# Patient Record
Sex: FEMALE | Race: OTHER | NOT HISPANIC OR LATINO | Employment: FULL TIME | URBAN - METROPOLITAN AREA
[De-identification: names, ages, dates, MRNs, and addresses within clinical notes are randomized per-mention and may not be internally consistent; named-entity substitution may affect disease eponyms.]

---

## 2017-02-06 ENCOUNTER — OFFICE VISIT (OUTPATIENT)
Dept: MEDICAL GROUP | Facility: MEDICAL CENTER | Age: 52
End: 2017-02-06
Payer: COMMERCIAL

## 2017-02-06 VITALS
HEART RATE: 64 BPM | BODY MASS INDEX: 20.72 KG/M2 | OXYGEN SATURATION: 95 % | HEIGHT: 67 IN | DIASTOLIC BLOOD PRESSURE: 68 MMHG | SYSTOLIC BLOOD PRESSURE: 120 MMHG | WEIGHT: 132 LBS | TEMPERATURE: 98.1 F

## 2017-02-06 DIAGNOSIS — Z00.00 PREVENTATIVE HEALTH CARE: Chronic | ICD-10-CM

## 2017-02-06 DIAGNOSIS — M25.511 RIGHT SHOULDER PAIN, UNSPECIFIED CHRONICITY: ICD-10-CM

## 2017-02-06 DIAGNOSIS — M54.5 LOW BACK PAIN, UNSPECIFIED BACK PAIN LATERALITY, UNSPECIFIED CHRONICITY, WITH SCIATICA PRESENCE UNSPECIFIED: ICD-10-CM

## 2017-02-06 DIAGNOSIS — E78.5 DYSLIPIDEMIA: Chronic | ICD-10-CM

## 2017-02-06 DIAGNOSIS — R10.31 RIGHT LOWER QUADRANT PAIN: ICD-10-CM

## 2017-02-06 PROCEDURE — 99396 PREV VISIT EST AGE 40-64: CPT | Performed by: INTERNAL MEDICINE

## 2017-02-06 ASSESSMENT — ENCOUNTER SYMPTOMS
DEPRESSION: 0
PALPITATIONS: 0
SHORTNESS OF BREATH: 0
WEIGHT LOSS: 0
HEARTBURN: 0
COUGH: 0
BLURRED VISION: 0
BACK PAIN: 1
DIARRHEA: 0
INSOMNIA: 0
DOUBLE VISION: 0
HEADACHES: 1
DIZZINESS: 0
ABDOMINAL PAIN: 0

## 2017-02-06 ASSESSMENT — PATIENT HEALTH QUESTIONNAIRE - PHQ9: CLINICAL INTERPRETATION OF PHQ2 SCORE: 0
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
## 2017-02-06 NOTE — MR AVS SNAPSHOT
"        Frank Fabian   2017 2:00 PM   Office Visit   MRN: [id number]    Department:  [facility]   Dept Phone:  [phone]    Description:  Female : 1965   Provider:  Chago Fontenot M.D.           Allergies as of 2017     No Known Allergies      You were diagnosed with     Preventative health care   [[id number]]       Right lower quadrant pain   [436186]       Dyslipidemia   [150256]       Low back pain, unspecified back pain laterality, unspecified chronicity, with sciatica presence unspecified   [[id number]]       Right shoulder pain, unspecified chronicity   [[id number]]         Vital Signs     Blood Pressure Pulse Temperature Height Weight Body Mass Index    120/68 mmHg 64 36.7 °C (98.1 °F) 1.702 m (5' 7\") 59.875 kg (132 lb) 20.67 kg/m2    Oxygen Saturation Smoking Status                95% Never Smoker           Basic Information     Date Of Birth Sex Race Ethnicity Preferred Language    1965 Female Other Non- English      Problem List              ICD-10-CM Priority Class Noted - Resolved    S/P appendectomy Z90.49   2010 - Present    Preventative health care (Chronic) Z00.00   2010 - Present    Low back pain M54.5   2010 - Present    Dyslipidemia (Chronic) E78.5   2010 - Present    History of depression Z86.59   2010 - Present    Shoulder pain M25.519   2013 - Present    History of UTI Z87.440   2015 - Present      Health Maintenance        Date Due Completion Dates    IMM DTaP/Tdap/Td Vaccine (1 - Tdap) 1986 ---    IMM INFLUENZA (1) 2016 ---    PAP SMEAR 2016, 2012    MAMMOGRAM 2016 (Declined), 2015 (Declined), 2014 (Declined), 2013 (Declined)    Override on 2015: Patient Declined    Override on 2015: Patient Declined    Override on 2014: Patient Declined    Override on 2013: Patient Declined            Current Immunizations     No immunizations on file.      "   Below and/or attached are the medications your provider expects you to take. Review all of your home medications and newly ordered medications with your provider and/or pharmacist. Follow medication instructions as directed by your provider and/or pharmacist. Please keep your medication list with you and share with your provider. Update the information when medications are discontinued, doses are changed, or new medications (including over-the-counter products) are added; and carry medication information at all times in the event of emergency situations     Allergies:  No Known Allergies          Medications  Valid as of: February 06, 2017 -  2:30 PM    Generic Name Brand Name Tablet Size Instructions for use    .                 Medicines prescribed today were sent to:     [id number] - [place], [place] [address]    [address] [place] [address]    Phone: [phone] Fax: [phone]    Open 24 Hours?: No      Medication refill instructions:       If your prescription bottle indicates you have medication refills left, it is not necessary to call your provider’s office. Please contact your pharmacy and they will refill your medication.    If your prescription bottle indicates you do not have any refills left, you may request refills at any time through one of the following ways: The online [employer] system (except Urgent Care), by calling your provider’s office, or by asking your pharmacy to contact your provider’s office with a refill request. Medication refills are processed only during regular business hours and may not be available until the next business day. Your provider may request additional information or to have a follow-up visit with you prior to refilling your medication.   *Please Note: Medication refills are assigned a new Rx number when refilled electronically. Your pharmacy may indicate that no refills were authorized even though a new prescription for the same medication is  available at the pharmacy. Please request the medicine by name with the pharmacy before contacting your provider for a refill.        Your To Do List     Future Labs/Procedures Complete By Expires    CBC WITH DIFFERENTIAL  As directed 2/7/2018    COMP METABOLIC PANEL  As directed 2/7/2018    ESTRADIOL  As directed 2/6/2018    FSH/LH  As directed 2/7/2018    LIPID PROFILE  As directed 2/7/2018    TSH  As directed 2/7/2018    URINALYSIS,CULTURE IF INDICATED  As directed 2/7/2018    US-GYN-PELVIS TRANSVAGINAL  As directed 2/6/2018    VITAMIN D,25 HYDROXY  As directed 2/7/2018      Other Notes About Your Plan                      MyChart Access Code: Activation code not generated  Current [employer] Status: Active

## 2017-02-12 LAB
25(OH)D3+25(OH)D2 SERPL-MCNC: 22.7 NG/ML (ref 30–100)
ALBUMIN SERPL-MCNC: 4.2 G/DL (ref 3.5–5.5)
ALBUMIN/GLOB SERPL: 1.5 {RATIO} (ref 1.1–2.5)
ALP SERPL-CCNC: 59 IU/L (ref 39–117)
ALT SERPL-CCNC: 13 IU/L (ref 0–32)
APPEARANCE UR: CLEAR
AST SERPL-CCNC: 16 IU/L (ref 0–40)
BACTERIA #/AREA URNS HPF: NORMAL /[HPF]
BACTERIA UR CULT: NORMAL
BACTERIA UR CULT: NORMAL
BASOPHILS # BLD AUTO: 0 X10E3/UL (ref 0–0.2)
BASOPHILS NFR BLD AUTO: 0 %
BILIRUB SERPL-MCNC: 1.1 MG/DL (ref 0–1.2)
BILIRUB UR QL STRIP: NEGATIVE
BUN SERPL-MCNC: 12 MG/DL (ref 6–24)
BUN/CREAT SERPL: 15 (ref 9–23)
CALCIUM SERPL-MCNC: 9.1 MG/DL (ref 8.7–10.2)
CASTS URNS MICRO: NORMAL
CASTS URNS QL MICRO: NORMAL
CHLORIDE SERPL-SCNC: 102 MMOL/L (ref 96–106)
CHOLEST SERPL-MCNC: 239 MG/DL (ref 100–199)
CO2 SERPL-SCNC: 25 MMOL/L (ref 18–29)
COLOR UR: YELLOW
COMMENT 011824: ABNORMAL
CREAT SERPL-MCNC: 0.82 MG/DL (ref 0.57–1)
CRYSTALS URNS MICRO: NORMAL
EOSINOPHIL # BLD AUTO: 0.1 X10E3/UL (ref 0–0.4)
EOSINOPHIL NFR BLD AUTO: 1 %
EPI CELLS #/AREA URNS HPF: NORMAL /HPF
ERYTHROCYTE [DISTWIDTH] IN BLOOD BY AUTOMATED COUNT: 14.1 % (ref 12.3–15.4)
ESTRADIOL SERPL-MCNC: 223 PG/ML
FSH SERPL-ACNC: 4.9 MIU/ML
GLOBULIN SER CALC-MCNC: 2.8 G/DL (ref 1.5–4.5)
GLUCOSE SERPL-MCNC: 91 MG/DL (ref 65–99)
GLUCOSE UR QL: NEGATIVE
HCT VFR BLD AUTO: 43.6 % (ref 34–46.6)
HDLC SERPL-MCNC: 96 MG/DL
HGB BLD-MCNC: 14.2 G/DL (ref 11.1–15.9)
HGB UR QL STRIP: NEGATIVE
IMM GRANULOCYTES # BLD: 0 X10E3/UL (ref 0–0.1)
IMM GRANULOCYTES NFR BLD: 0 %
IMMATURE CELLS  115398: NORMAL
KETONES UR QL STRIP: NEGATIVE
LDLC SERPL CALC-MCNC: 132 MG/DL (ref 0–99)
LEUKOCYTE ESTERASE UR QL STRIP: ABNORMAL
LH SERPL-ACNC: 6.8 MIU/ML
LYMPHOCYTES # BLD AUTO: 1.6 X10E3/UL (ref 0.7–3.1)
LYMPHOCYTES NFR BLD AUTO: 24 %
MCH RBC QN AUTO: 29.5 PG (ref 26.6–33)
MCHC RBC AUTO-ENTMCNC: 32.6 G/DL (ref 31.5–35.7)
MCV RBC AUTO: 91 FL (ref 79–97)
MICRO URNS: ABNORMAL
MICRO URNS: ABNORMAL
MONOCYTES # BLD AUTO: 0.6 X10E3/UL (ref 0.1–0.9)
MONOCYTES NFR BLD AUTO: 9 %
MORPHOLOGY BLD-IMP: NORMAL
MUCOUS THREADS URNS QL MICRO: PRESENT
NEUTROPHILS # BLD AUTO: 4.4 X10E3/UL (ref 1.4–7)
NEUTROPHILS NFR BLD AUTO: 66 %
NITRITE UR QL STRIP: NEGATIVE
NRBC BLD AUTO-RTO: NORMAL %
PH UR STRIP: 7 [PH] (ref 5–7.5)
PLATELET # BLD AUTO: 305 X10E3/UL (ref 150–379)
POTASSIUM SERPL-SCNC: 4.7 MMOL/L (ref 3.5–5.2)
PROT SERPL-MCNC: 7 G/DL (ref 6–8.5)
PROT UR QL STRIP: NEGATIVE
RBC # BLD AUTO: 4.81 X10E6/UL (ref 3.77–5.28)
RBC #/AREA URNS HPF: NORMAL /HPF
RENAL EPI CELLS #/AREA URNS HPF: NORMAL /[HPF]
SODIUM SERPL-SCNC: 140 MMOL/L (ref 134–144)
SP GR UR: 1.02 (ref 1–1.03)
T VAGINALIS URNS QL MICRO: NORMAL
TRIGL SERPL-MCNC: 53 MG/DL (ref 0–149)
TSH SERPL DL<=0.005 MIU/L-ACNC: 2.49 UIU/ML (ref 0.45–4.5)
UNIDENT CRYS URNS QL MICRO: NORMAL
URINALYSIS REFLEX  377202: ABNORMAL
URNS CMNT MICRO: NORMAL
UROBILINOGEN UR STRIP-MCNC: 0.2 MG/DL (ref 0.2–1)
VLDLC SERPL CALC-MCNC: 11 MG/DL (ref 5–40)
WBC # BLD AUTO: 6.7 X10E3/UL (ref 3.4–10.8)
WBC #/AREA URNS HPF: NORMAL /HPF
YEAST #/AREA URNS HPF: NORMAL /[HPF]

## 2017-02-13 ENCOUNTER — TELEPHONE (OUTPATIENT)
Dept: MEDICAL GROUP | Facility: MEDICAL CENTER | Age: 52
End: 2017-02-13

## 2017-02-14 NOTE — TELEPHONE ENCOUNTER
Notified with results, start vitamin D 4000 units daily  Continue good work with nutrition and exercise

## 2017-02-28 ENCOUNTER — TELEPHONE (OUTPATIENT)
Dept: MEDICAL GROUP | Facility: MEDICAL CENTER | Age: 52
End: 2017-02-28

## 2017-02-28 NOTE — TELEPHONE ENCOUNTER
Pt left message stating she was not contacted by imaging to schedule. Spoke with pt and connected her to [facility] with imaging to schedule.

## 2017-03-21 ENCOUNTER — HOSPITAL ENCOUNTER (OUTPATIENT)
Dept: RADIOLOGY | Facility: MEDICAL CENTER | Age: 52
End: 2017-03-21
Attending: INTERNAL MEDICINE
Payer: COMMERCIAL

## 2017-08-25 ENCOUNTER — OFFICE VISIT (OUTPATIENT)
Dept: MEDICAL GROUP | Facility: MEDICAL CENTER | Age: 52
End: 2017-08-25
Payer: COMMERCIAL

## 2017-08-25 ENCOUNTER — HOSPITAL ENCOUNTER (OUTPATIENT)
Facility: MEDICAL CENTER | Age: 52
End: 2017-08-25
Attending: INTERNAL MEDICINE
Payer: COMMERCIAL

## 2017-08-25 VITALS
TEMPERATURE: 99.8 F | DIASTOLIC BLOOD PRESSURE: 56 MMHG | HEART RATE: 91 BPM | BODY MASS INDEX: 19.15 KG/M2 | HEIGHT: 67 IN | SYSTOLIC BLOOD PRESSURE: 102 MMHG | OXYGEN SATURATION: 97 % | WEIGHT: 122 LBS

## 2017-08-25 DIAGNOSIS — N39.0 URINARY TRACT INFECTION WITHOUT HEMATURIA, SITE UNSPECIFIED: ICD-10-CM

## 2017-08-25 LAB
APPEARANCE UR: ABNORMAL
BILIRUB UR STRIP-MCNC: ABNORMAL MG/DL
COLOR UR AUTO: ABNORMAL
GLUCOSE UR STRIP.AUTO-MCNC: ABNORMAL MG/DL
KETONES UR STRIP.AUTO-MCNC: ABNORMAL MG/DL
LEUKOCYTE ESTERASE UR QL STRIP.AUTO: ABNORMAL
NITRITE UR QL STRIP.AUTO: ABNORMAL
PH UR STRIP.AUTO: 5 [PH] (ref 5–8)
PROT UR QL STRIP: ABNORMAL MG/DL
RBC UR QL AUTO: ABNORMAL
SP GR UR STRIP.AUTO: 1.01
UROBILINOGEN UR STRIP-MCNC: ABNORMAL MG/DL

## 2017-08-25 PROCEDURE — 99214 OFFICE O/P EST MOD 30 MIN: CPT | Performed by: INTERNAL MEDICINE

## 2017-08-25 PROCEDURE — 81002 URINALYSIS NONAUTO W/O SCOPE: CPT | Performed by: INTERNAL MEDICINE

## 2017-08-25 PROCEDURE — 87086 URINE CULTURE/COLONY COUNT: CPT

## 2017-08-25 PROCEDURE — 87077 CULTURE AEROBIC IDENTIFY: CPT

## 2017-08-25 PROCEDURE — 87186 SC STD MICRODIL/AGAR DIL: CPT

## 2017-08-25 RX ORDER — SULFAMETHOXAZOLE AND TRIMETHOPRIM 800; 160 MG/1; MG/1
1 TABLET ORAL 2 TIMES DAILY
Qty: 14 TAB | Refills: 0 | Status: SHIPPED | OUTPATIENT
Start: 2017-08-25 | End: 2017-12-15

## 2017-08-25 NOTE — MR AVS SNAPSHOT
"Frank Fabian   2017 9:20 AM   Office Visit   MRN: [id number]    Department:  [facility]   Dept Phone:  [phone]    Description:  Female : 1965   Provider:  Chago Fontenot M.D.           Reason for Visit     Dysuria poss UTI      Allergies as of 2017     No Known Allergies      You were diagnosed with     Urinary tract infection without hematuria, site unspecified   [[id number]]         Vital Signs     Blood Pressure Pulse Temperature Height Weight Body Mass Index    102/56 mmHg 91 37.7 °C (99.8 °F) 1.702 m (5' 7\") 55.339 kg (122 lb) 19.10 kg/m2    Oxygen Saturation Smoking Status                97% Never Smoker           Basic Information     Date Of Birth Sex Race Ethnicity Preferred Language    1965 Female Other Non- English      Problem List              ICD-10-CM Priority Class Noted - Resolved    S/P appendectomy Z90.49   2010 - Present    Preventative health care (Chronic) Z00.00   2010 - Present    Low back pain M54.5   2010 - Present    Dyslipidemia (Chronic) E78.5   2010 - Present    History of depression Z86.59   2010 - Present    Shoulder pain M25.519   2013 - Present    History of UTI Z87.440   2015 - Present      Health Maintenance        Date Due Completion Dates    IMM DTaP/Tdap/Td Vaccine (1 - Tdap) 1986 ---    PAP SMEAR 2016, 2012    MAMMOGRAM 2016 (Declined), 2015 (Declined), 2014 (Declined), 2013 (Declined)    Override on 2015: Patient Declined    Override on 2015: Patient Declined    Override on 2014: Patient Declined    Override on 2013: Patient Declined    COLONOSCOPY 2017 ---    IMM INFLUENZA (1) 2017 ---            Results     POCT Urinalysis      Component Value Standard Range & Units    POC Color Michelle Negative    POC Appearance CLOUDY Negative    POC Leukocyte Esterase MOD Negative    POC Nitrites NEG Negative   " POC Urobiligen NEG Negative (0.2) mg/dL    POC Protein TRACE Negative mg/dL    POC Urine PH 5.0 5.0 - 8.0    POC Blood MOD Negative    POC Specific Gravity 1.015 <1.005 - >1.030    POC Ketones NEG Negative mg/dL    POC Biliruben NEG Negative mg/dL    POC Glucose NEG Negative mg/dL                        Current Immunizations     No immunizations on file.      Below and/or attached are the medications your provider expects you to take. Review all of your home medications and newly ordered medications with your provider and/or pharmacist. Follow medication instructions as directed by your provider and/or pharmacist. Please keep your medication list with you and share with your provider. Update the information when medications are discontinued, doses are changed, or new medications (including over-the-counter products) are added; and carry medication information at all times in the event of emergency situations     Allergies:  No Known Allergies          Medications  Valid as of: August 25, 2017 - 10:15 AM    Generic Name Brand Name Tablet Size Instructions for use    Sulfamethoxazole-Trimethoprim (Tab) BACTRIM -160 MG Take 1 Tab by mouth 2 times a day.        .                 Medicines prescribed today were sent to:     [id number] - [place], [place] - [address]    [address] [place] [address]    Phone: [phone] Fax: [phone]    Open 24 Hours?: No      Medication refill instructions:       If your prescription bottle indicates you have medication refills left, it is not necessary to call your provider’s office. Please contact your pharmacy and they will refill your medication.    If your prescription bottle indicates you do not have any refills left, you may request refills at any time through one of the following ways: The online [employer] system (except Urgent Care), by calling your provider’s office, or by asking your pharmacy to contact your provider’s office with a refill request.  Medication refills are processed only during regular business hours and may not be available until the next business day. Your provider may request additional information or to have a follow-up visit with you prior to refilling your medication.   *Please Note: Medication refills are assigned a new Rx number when refilled electronically. Your pharmacy may indicate that no refills were authorized even though a new prescription for the same medication is available at the pharmacy. Please request the medicine by name with the pharmacy before contacting your provider for a refill.        Your To Do List     Future Labs/Procedures Complete By Expires    URINE CULTURE(NEW)  As directed 8/25/2018      Other Notes About Your Plan     2/6/17 fatigue, hot flashes, question perimenopausal or premenstrual dysphoric disorder, gynecologic ultrasound ordered, schedule follow-up pap smear  2/13/17 cbc,cmp,tsh,FSH,LH,estradiol normal  8/25/17 UTI?   Mammogram,colon                   MyChart Access Code: Activation code not generated  Current MyChart Status: Active
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
## 2017-08-27 ENCOUNTER — TELEPHONE (OUTPATIENT)
Dept: MEDICAL GROUP | Facility: MEDICAL CENTER | Age: 52
End: 2017-08-27

## 2017-08-27 LAB
BACTERIA UR CULT: ABNORMAL
SIGNIFICANT IND 70042: ABNORMAL
SOURCE SOURCE: ABNORMAL

## 2017-08-27 NOTE — TELEPHONE ENCOUNTER
Notified with results  UTI organism sensitive to Bactrim which she was started on Friday she will complete course of antibiotic

## 2017-08-28 ENCOUNTER — TELEPHONE (OUTPATIENT)
Dept: MEDICAL GROUP | Facility: MEDICAL CENTER | Age: 52
End: 2017-08-28

## 2017-08-28 DIAGNOSIS — N39.0 URINARY TRACT INFECTION WITHOUT HEMATURIA, SITE UNSPECIFIED: ICD-10-CM

## 2017-08-28 RX ORDER — CIPROFLOXACIN 250 MG/1
250 TABLET, FILM COATED ORAL 2 TIMES DAILY
Qty: 14 TAB | Refills: 0 | Status: SHIPPED | OUTPATIENT
Start: 2017-08-28 | End: 2017-12-15

## 2017-08-28 NOTE — TELEPHONE ENCOUNTER
If she is having headache from the antibiotic, we can change to a different antibiotic, even though she had the same antibiotic 4 years ago without side effects.    I will change her to Cipro, one pill twice a day for 7 days, she has also had this antibiotic previously without issues. Sent to [facility].    I will send a prescription to her pharmacy

## 2017-08-28 NOTE — TELEPHONE ENCOUNTER
1. Caller Name: Frank Fabian                                           Call Back Number: [phone] (home) [phone] (work)       2. Patient‘s symptoms (location & severity)? HA severe since starting ABX in bed x3 days    3. Is this a new symptom? yes    4. When did it start? FRI    5. Action taken per active symptom guide: no (Same day scheduled; UC recommended; ED recommended)    6. Patient agrees to recommended action per active symptom guide: no  If no, patient was advised again of recommendation and patient verbalized understanding.

## 2017-08-29 NOTE — TELEPHONE ENCOUNTER
Please notify patient that we can order labs for her kidney function, I will place those orders in the computer system.    I do not have any record of her being on contraceptive therapy currently, does she get this from her gynecologist?

## 2017-08-29 NOTE — TELEPHONE ENCOUNTER
Pt went to  and they told her to drink water because she was dehydrated. She will finish the original ABX if you want her to. No headache. Please advise.     Pt has R kidney pain. Wants to make sure her kidneys are ok.    Pt thinks her UTI's are caused by her contraception. Pt would like to switch contraception. Told her she would need an appt for this. Pt scheduled on 9/1/17.

## 2017-09-01 ENCOUNTER — OFFICE VISIT (OUTPATIENT)
Dept: MEDICAL GROUP | Facility: MEDICAL CENTER | Age: 52
End: 2017-09-01
Payer: COMMERCIAL

## 2017-09-01 VITALS
DIASTOLIC BLOOD PRESSURE: 58 MMHG | OXYGEN SATURATION: 96 % | HEART RATE: 85 BPM | TEMPERATURE: 97.7 F | HEIGHT: 67 IN | WEIGHT: 122 LBS | BODY MASS INDEX: 19.15 KG/M2 | SYSTOLIC BLOOD PRESSURE: 104 MMHG

## 2017-09-01 DIAGNOSIS — Z87.440 HISTORY OF UTI: ICD-10-CM

## 2017-09-01 PROCEDURE — 99213 OFFICE O/P EST LOW 20 MIN: CPT | Performed by: INTERNAL MEDICINE

## 2017-09-01 NOTE — PROGRESS NOTES
"Subjective:      Frank Fabian is a 52 y.o. female who presents with UTI        HPI     Follow-up recent UTI Proteus sensitive to all antibiotics, started and completed Bactrim, did have initial headache urgent care thought to be dehydrated, completed course of Bactrim without rash, no current dysuria, no hematuria, did have some flank pain initially but complete a seven-day course of Bactrim, currently afebrile.  Has had urinary tract infections on occasion, no more than one per year, perimenopausal, last menstrual cycle in May, no birth control.  No diabetes, no history of immunosuppression.  No vaginal bleeding or spotting.  Urinary tract infections do not occur after intercourse.        Current Outpatient Prescriptions   Medication Sig Dispense Refill   • sulfamethoxazole-trimethoprim (BACTRIM DS) 800-160 MG tablet Take 1 Tab by mouth 2 times a day. 14 Tab 0   • ciprofloxacin (CIPRO) 250 MG Tab Take 1 Tab by mouth 2 times a day. 14 Tab 0     No current facility-administered medications for this visit.      Patient Active Problem List    Diagnosis Date Noted   • History of UTI 09/29/2015   • Shoulder pain 05/16/2013   • History of depression 12/02/2010   • S/P appendectomy 11/09/2010   • Preventative health care 11/09/2010   • Low back pain 11/09/2010   • Dyslipidemia 11/09/2010         ROS       Objective:     /58   Pulse 85   Temp 36.5 °C (97.7 °F)   Ht 1.702 m (5' 7\")   Wt 55.3 kg (122 lb)   SpO2 96%   BMI 19.11 kg/m²      Physical Exam   Constitutional: She appears well-developed and well-nourished. No distress.   HENT:   Head: Normocephalic and atraumatic.   Eyes: Conjunctivae are normal. Right eye exhibits no discharge. Left eye exhibits no discharge.   Neck: No JVD present.   Cardiovascular: Normal rate.    Pulmonary/Chest: Effort normal. No respiratory distress. She has no wheezes.   Abdominal: She exhibits no distension.   Musculoskeletal: She exhibits no edema.   Neurological: She is " alert.   Skin: She is not diaphoretic.   Psychiatric: She has a normal mood and affect. Her behavior is normal.   Nursing note and vitals reviewed.              Assessment/Plan:        Assessment  #1 recent UTI resolved, Proteus organism sensitive to Bactrim, no further symptoms    #2 history of recurrent UTIs typically one per year, not occurring after sexual activity    #3 perimenopausal occasional hot flashes, last menstrual cycle in May      Plan  #1 no further treatment for this UTI required we did send a earlier prescription to the pharmacy for ciprofloxacin thinking that she may have had a reaction to Bactrim, but she went to urgent care and was told that the headache was related to dehydration, she completed Bactrim without issue    #2 for recurrent UTI or UTI after intercourse she could consider antibiotics, if she had more than more than 3 urinary tract infections per year we could consider prophylaxis but her frequency is much less, should she have UTI after intercourse, she could try antibiotics after intercourse but this is not the case, I would recommend having Cipro on hand in case she should develop urinary tract symptoms, typically once a year this would not be a problem, the ciprofloxacin prescription is already at her pharmacy    #3 follow-up for Pap smear    #4 she can discontinue spermicide and have her partner use condoms with activity

## 2017-12-05 ENCOUNTER — TELEPHONE (OUTPATIENT)
Dept: MEDICAL GROUP | Facility: MEDICAL CENTER | Age: 52
End: 2017-12-05

## 2017-12-05 NOTE — TELEPHONE ENCOUNTER
1. Caller Name: Pt                      Call Back Number: [phone]    2. Message: Pt called stating she is having pain in her lower right abdomen. She is wondering if she should have the other US done that you guys discussed at her physical in February.    3. Patient approves office to leave a detailed voicemail/MyChart message: N\A

## 2017-12-06 NOTE — TELEPHONE ENCOUNTER
If she is having lower abdominal pain she should be seen and evaluated, an ultrasound would not be the ideal study for that, possibly she may need a CT scan.  Nevertheless, she should be seen for that symptom

## 2017-12-15 ENCOUNTER — OFFICE VISIT (OUTPATIENT)
Dept: MEDICAL GROUP | Facility: MEDICAL CENTER | Age: 52
End: 2017-12-15
Payer: COMMERCIAL

## 2017-12-15 ENCOUNTER — TELEPHONE (OUTPATIENT)
Dept: MEDICAL GROUP | Facility: MEDICAL CENTER | Age: 52
End: 2017-12-15

## 2017-12-15 VITALS
HEART RATE: 85 BPM | DIASTOLIC BLOOD PRESSURE: 72 MMHG | BODY MASS INDEX: 19.46 KG/M2 | HEIGHT: 67 IN | TEMPERATURE: 97.9 F | SYSTOLIC BLOOD PRESSURE: 108 MMHG | WEIGHT: 124 LBS | OXYGEN SATURATION: 98 %

## 2017-12-15 DIAGNOSIS — Z12.39 BREAST CANCER SCREENING: ICD-10-CM

## 2017-12-15 DIAGNOSIS — Z12.11 COLON CANCER SCREENING: ICD-10-CM

## 2017-12-15 DIAGNOSIS — R10.31 RIGHT LOWER QUADRANT ABDOMINAL PAIN: ICD-10-CM

## 2017-12-15 DIAGNOSIS — R10.11 RIGHT UPPER QUADRANT PAIN: ICD-10-CM

## 2017-12-15 PROCEDURE — 99214 OFFICE O/P EST MOD 30 MIN: CPT | Performed by: INTERNAL MEDICINE

## 2017-12-16 NOTE — PROGRESS NOTES
Subjective:      Frank Fabian is a 52 y.o. female who presents with right lower quadrant pain        HPI    Has had right lower quadrant pain since February, has seen gyn and had pelvic ultrasound done at [facility] in May apparently negative we do not have those results, discomfort does not happen daily, will vary in intensity, may be few minutes to hours all day, pain scale range 3-4/10, can be sharp or dull in nature, no associated nausea or emesis, no diarrhea, no melena, no constipation, pain occurs during night and day, no bloating or gas, no change in stools, no blood in stool, no obvious radiation to the back, no fevers or chills, not related to meals or food, not related to exercise or activity, no trauma, menses infrequent and not related to pain, no dysuria or hematuria, occasional right upper quadrant discomfort not associated with lower abdominal pain, the right upper quadrant discomfort not daily, not as frequent and no nausea or emesis, no gerd, no bloating associated with this, no change in appetite or weight , no regular NSAIDs, no recent antibiotics, no cough, short of breath, sore throat, chest pain, right upper quadrant pain not as severe, no radiation  History of appendectomy  No history of gallstones or kidney stones  Chronic low back pain  Chronic right shoulder pain  Not related to coffee   No vaginal bleedin   No nsaids, no tylenol   I saw her in February questioned IBS versus adhesions from appendectomy, ordered gynecologic ultrasound which apparently was done at [place] although I did not get that result        Current Outpatient Prescriptions   Medication Sig Dispense Refill   • ciprofloxacin (CIPRO) 250 MG Tab Take 1 Tab by mouth 2 times a day. 14 Tab 0   • sulfamethoxazole-trimethoprim (BACTRIM DS) 800-160 MG tablet Take 1 Tab by mouth 2 times a day. 14 Tab 0     No current facility-administered medications for this visit.      Patient Active Problem List   Diagnosis   • S/P  appendectomy   • Preventative health care   • Low back pain   • Dyslipidemia   • History of depression   • Shoulder pain   • History of UTI         Dyslipidemia  10/10 chol 239,trig 71,hdl 81,ldl 144  7/12 chol 226,trig 53,hdl 73,ldl 142  10/16/12 chol 249,trig 74,hdl 91,ldl 143  9/17/13 chol 225,trig 66,hdl 76,ldl 136  10/11/13 chol 245,trig 74,hdl 82,ldl 148  11/22/14 chol 216,trig 60,hdl 81,ldl 123    11/25/15 chol 204,trig 54,hdl 76,ldl 117  7/30/16 chol 254,trig 66,hdl 92,ldl 149  2/13/17 chl 239,trig 53,hdl 96,ldl 132     History depression  Had been on prozac many yrs ago  12/2/10 start zoloft 50 mg  1/11 off zoloft  3/14/13 start zoloft    11/18/14 off zoloft     History UTI  3/14/13 UTI e.coli sensitive to all antibiotics  6/24/14 UTI e.coli    9/29/15 UTI symptoms self treated with cipro    8/25/17 UTI proteus sensitive to all antbiotics except nitrofurantoin, started on bactrim  9/1/17 use cipro prn UTI symptoms     Low back pain  11/09 MRI LS L4-5 disc reveals minimal disc bulging   12/21/16 MRI lumbar spine slight interval progression of lumbar spondylosis compared to 2009 per  [facility]   2/6/17 has tried anti-inflammatories, prednisone, narcotics, physical therapy, acupuncture no benefit     Preventative health  9/12/13 pap negative  11/19/15 declines mammogram,flu,tdap  6/17/16 td in [place]  2/6/17 declines mammogram  2/13/17 vit d 22 start 4000 units daily     Status post appendectomy     Shoulder pain  5/16/13 x-ray of shoulder minimal DTT acromioclavicular joint  5/16/13 MRI left shoulder moderate acromioclavicular edema suggesting grade 1 sprain or overuse, intact rotator cuff and labrum  9/23/13 saw [facility] no report yet, surgery recommended, would like second opinion, referral to  pending  2/26/14  ortho note; left a.c. joint injection for bursitis, repeat a.c. joint or possible subacromial or subcoracoid injection in the future if symptoms persist  9/22/16  " [facility] note anterior right shoulder pain unclear etiology, referred for MRI  10/5/16 MRI right shoulder tendinosis supraspinatus, mild DJD right before meals with significant marrow edema and soft tissue edema, likely external impingement supraspinatus  12/20/16  orthopedic note persistent right shoulder pain despite steroid injection, right shoulder rotator cuff syndrome and before meals injury, possible subcoracoid impingement, offered repeat injection versus arthroscopy, patient will consider       Health Maintenance Summary                IMM DTaP/Tdap/Td Vaccine Overdue 6/16/1986     PAP SMEAR Overdue 9/12/2016      Done 9/12/2013 PAP IG, CT-NG,RFXHPV ALL 16&18     Patient has more history with this topic...    MAMMOGRAM Overdue 11/19/2016      Patient Declined 11/19/2015      Patient has more history with this topic...    COLONOSCOPY Overdue 6/16/2017     IMM INFLUENZA Overdue 9/1/2017           Patient Care Team:  Chago Fontenot M.D. as PCP - General (Internal Medicine)    ROS       Objective:     /72   Pulse 85   Temp 36.6 °C (97.9 °F)   Ht 1.702 m (5' 7\")   Wt 56.2 kg (124 lb)   SpO2 98%   BMI 19.42 kg/m²      Physical Exam   Constitutional: She appears well-developed and well-nourished. No distress.   HENT:   Head: Normocephalic and atraumatic.   Right Ear: External ear normal.   Left Ear: External ear normal.   Mouth/Throat: Oropharynx is clear and moist.   Eyes: Conjunctivae are normal. Right eye exhibits no discharge. Left eye exhibits no discharge.   Neck: Neck supple. No JVD present.   Cardiovascular: Normal rate and regular rhythm.    Pulmonary/Chest: Effort normal and breath sounds normal. No respiratory distress. She has no wheezes.   Abdominal: Soft. She exhibits no distension and no mass. There is no tenderness. There is no rebound and no guarding.   No hepatosplenomegaly  No inguinal adenopathy  No abdominal masses   Neurological: She is alert.   Skin: Skin is " warm. She is not diaphoretic.   Psychiatric: She has a normal mood and affect. Her behavior is normal.   Nursing note and vitals reviewed.              Assessment/Plan:     Assessment  #1 Chronic right lower extremity pain, has been seen by gynecology, apparently had gynecologic ultrasound in May no significant abnormalities, previous history of appendectomy, pain appears to be localized in the right lower quadrant occurring intermittently unrelated to meals, position, activity, no diarrhea, melena, hematochezia or change in bowel habits to suggest colitis, less likely kidney stone, no evidence of hernia, consider adhesions, IBS, no family history of colon cancer she has not had her screening colonoscopy at this time    #2 occasional right upper quadrant pain question biliary disease, no history of gallstones or pancreatitis, no history of liver inflammation, right upper quadrant pain unrelated to meals, appears unrelated to right lower quadrant discomfort    #3 history of low back pain no recent flareup, do not suspect atypical radiation of low back pain in to the right inguinal region     #4 history of urinary tract infections, no recurrent symptoms, do not suspect UTI is causing her symptoms    #5 preventative health has not had colon cancer screening    #6 history of appendectomy      Plan  #1 obtain gynecologic ultrasound results from [place]    #2 obtain records in gynecology    #3 CT scan abdomen and pelvis with contrast    #4 labs    #5 referral to GI for colon cancer screening and evaluation right lower quadrant and upper quadrant pain    #6 follow up after test completed    #7 mammogram ordered

## 2017-12-18 ENCOUNTER — TELEPHONE (OUTPATIENT)
Dept: MEDICAL GROUP | Facility: MEDICAL CENTER | Age: 52
End: 2017-12-18

## 2017-12-18 NOTE — TELEPHONE ENCOUNTER
Spoke to Medical records dept. Notified they needed a faxed request to process. Faxed a request to [phone]

## 2017-12-21 ENCOUNTER — TELEPHONE (OUTPATIENT)
Dept: MEDICAL GROUP | Facility: MEDICAL CENTER | Age: 52
End: 2017-12-21

## 2017-12-21 NOTE — TELEPHONE ENCOUNTER
1. Caller Name: Frank Fabian                        Call Back Number: [phone] (home) [phone] (work)      2. Message: Pt called, says that RAD keeps asking her if her contrast is ORAL or IV. Please advise.    3. Patient approves office to leave a detailed voicemail/MyChart message: no

## 2017-12-22 ENCOUNTER — TELEPHONE (OUTPATIENT)
Dept: MEDICAL GROUP | Facility: MEDICAL CENTER | Age: 52
End: 2017-12-22

## 2017-12-22 DIAGNOSIS — Z12.11 COLON CANCER SCREENING: ICD-10-CM

## 2017-12-22 LAB
ALBUMIN SERPL-MCNC: 4.1 G/DL (ref 3.5–5.5)
ALBUMIN/GLOB SERPL: 1.7 {RATIO} (ref 1.2–2.2)
ALP SERPL-CCNC: 56 IU/L (ref 39–117)
ALT SERPL-CCNC: 11 IU/L (ref 0–32)
AST SERPL-CCNC: 17 IU/L (ref 0–40)
BASOPHILS # BLD AUTO: 0 X10E3/UL (ref 0–0.2)
BASOPHILS NFR BLD AUTO: 0 %
BILIRUB SERPL-MCNC: 1 MG/DL (ref 0–1.2)
BUN SERPL-MCNC: 11 MG/DL (ref 6–24)
BUN/CREAT SERPL: 14 (ref 9–23)
CALCIUM SERPL-MCNC: 8.9 MG/DL (ref 8.7–10.2)
CHLORIDE SERPL-SCNC: 102 MMOL/L (ref 96–106)
CO2 SERPL-SCNC: 26 MMOL/L (ref 18–29)
CREAT SERPL-MCNC: 0.77 MG/DL (ref 0.57–1)
EOSINOPHIL # BLD AUTO: 0.1 X10E3/UL (ref 0–0.4)
EOSINOPHIL NFR BLD AUTO: 2 %
ERYTHROCYTE [DISTWIDTH] IN BLOOD BY AUTOMATED COUNT: 13.3 % (ref 12.3–15.4)
GLOBULIN SER CALC-MCNC: 2.4 G/DL (ref 1.5–4.5)
GLUCOSE SERPL-MCNC: 88 MG/DL (ref 65–99)
HCT VFR BLD AUTO: 41.5 % (ref 34–46.6)
HGB BLD-MCNC: 13.5 G/DL (ref 11.1–15.9)
IF AFRICAN AMERICAN  100797: 104 ML/MIN/1.73
IF NON AFRICAN AMER 100791: 90 ML/MIN/1.73
IMM GRANULOCYTES # BLD: 0 X10E3/UL (ref 0–0.1)
IMM GRANULOCYTES NFR BLD: 0 %
IMMATURE CELLS  115398: NORMAL
LIPASE SERPL-CCNC: 22 U/L (ref 14–72)
LYMPHOCYTES # BLD AUTO: 1.3 X10E3/UL (ref 0.7–3.1)
LYMPHOCYTES NFR BLD AUTO: 25 %
MCH RBC QN AUTO: 30.2 PG (ref 26.6–33)
MCHC RBC AUTO-ENTMCNC: 32.5 G/DL (ref 31.5–35.7)
MCV RBC AUTO: 93 FL (ref 79–97)
MONOCYTES # BLD AUTO: 0.5 X10E3/UL (ref 0.1–0.9)
MONOCYTES NFR BLD AUTO: 10 %
MORPHOLOGY BLD-IMP: NORMAL
NEUTROPHILS # BLD AUTO: 3.2 X10E3/UL (ref 1.4–7)
NEUTROPHILS NFR BLD AUTO: 63 %
NRBC BLD AUTO-RTO: NORMAL %
PLATELET # BLD AUTO: 270 X10E3/UL (ref 150–379)
POTASSIUM SERPL-SCNC: 4.3 MMOL/L (ref 3.5–5.2)
PROT SERPL-MCNC: 6.5 G/DL (ref 6–8.5)
RBC # BLD AUTO: 4.47 X10E6/UL (ref 3.77–5.28)
SODIUM SERPL-SCNC: 140 MMOL/L (ref 134–144)
WBC # BLD AUTO: 5.1 X10E3/UL (ref 3.4–10.8)

## 2017-12-23 NOTE — TELEPHONE ENCOUNTER
Notified with results  Previous gastroenterologist for made for preventative and diagnostic purposes with symptoms of abdominal pain, I thought I had changed that order to indicate that as preventative but I do not see that amended order  Referral placed again for gastroenterology preventative colon cancer screening only per patient request

## 2018-01-02 ENCOUNTER — TELEPHONE (OUTPATIENT)
Dept: MEDICAL GROUP | Facility: MEDICAL CENTER | Age: 53
End: 2018-01-02

## 2018-01-03 NOTE — TELEPHONE ENCOUNTER
Please call [facility], there were 2 [facility] referrals submitted.  First on December 15, the second on December 22.  The initial referral on December 15 was for a diagnostic evaluation in addition for preventative screening, however the patient states that the insurance will not cover this unless it is preventative only, thus the second referral on December 22 was made for preventative screening only.    Unfortunately the second referral was canceled.    Could you please contact [facility] and have them cancel the December 15 referral and resubmit the December 22 GI referral preventative screening only?  This is per the patient's request.

## 2018-01-03 NOTE — TELEPHONE ENCOUNTER
Spoke to [facility], per Marii at ext: [phone] these are both coded the same as Colonoscopy is screening and preventative.

## 2018-01-03 NOTE — TELEPHONE ENCOUNTER
Please let the patient know (see below).  The only other option is to have her seen by the other GI group in [facility], [facility], I would have to submit another referral.  If she is in agreement, let me know.

## 2018-01-03 NOTE — TELEPHONE ENCOUNTER
Marii contacted GI to get more information and per [facility] Pt would not schedule as she did not want the apt before having the Colonoscopy. They are unable to just schedule Colonoscopy as Pt is having abdominal pain and needs an apt in office first, Pt declined. Unable to get further information as to what Pt is wanting with coding. Pt's insurance does not require a referral.

## 2018-01-09 ENCOUNTER — TELEPHONE (OUTPATIENT)
Dept: MEDICAL GROUP | Facility: MEDICAL CENTER | Age: 53
End: 2018-01-09

## 2018-01-09 DIAGNOSIS — Z12.11 COLON CANCER SCREENING: ICD-10-CM

## 2018-01-09 NOTE — TELEPHONE ENCOUNTER
Notified CT result done at [facility] stool in vault suggesting constipation, still needs screening colonoscopy since [facility] wanted to see her before her appointment because of abdominal discomfort in addition to screening colonoscopy diagnosis, patient does not want to be seen for a diagnostic colonoscopy, wants this to be a screening colonoscopy, we will refer to [facility] instead  She understands and agrees

## 2018-01-09 NOTE — TELEPHONE ENCOUNTER
Notified CT result done at [facility] stool in vault suggesting constipation, still needs screening colonoscopy since [facility] wanted to see her before her appointment because of abdominal discomfort in addition to screening colonoscopy diagnosis, patient does not want to be seen for a diagnostic colonoscopy, wants this to be a screening colonoscopy, we will refer to [facility] instead  She understands and agrees

## 2018-01-16 ENCOUNTER — APPOINTMENT (OUTPATIENT)
Dept: RADIOLOGY | Facility: MEDICAL CENTER | Age: 53
End: 2018-01-16
Attending: INTERNAL MEDICINE
Payer: COMMERCIAL

## 2018-05-05 PROBLEM — N83.209 OVARIAN CYST: Status: ACTIVE | Noted: 2018-05-05

## 2018-05-05 PROBLEM — R10.9 ABDOMINAL PAIN: Status: ACTIVE | Noted: 2018-05-05

## 2018-05-08 ENCOUNTER — OFFICE VISIT (OUTPATIENT)
Dept: MEDICAL GROUP | Facility: MEDICAL CENTER | Age: 53
End: 2018-05-08
Payer: COMMERCIAL

## 2018-05-08 ENCOUNTER — TELEPHONE (OUTPATIENT)
Dept: MEDICAL GROUP | Facility: MEDICAL CENTER | Age: 53
End: 2018-05-08

## 2018-05-08 VITALS
WEIGHT: 129 LBS | DIASTOLIC BLOOD PRESSURE: 64 MMHG | HEIGHT: 67 IN | TEMPERATURE: 98.9 F | OXYGEN SATURATION: 99 % | SYSTOLIC BLOOD PRESSURE: 110 MMHG | BODY MASS INDEX: 20.25 KG/M2 | HEART RATE: 68 BPM

## 2018-05-08 DIAGNOSIS — E78.5 DYSLIPIDEMIA: Chronic | ICD-10-CM

## 2018-05-08 DIAGNOSIS — Z00.00 PREVENTATIVE HEALTH CARE: Primary | Chronic | ICD-10-CM

## 2018-05-08 PROCEDURE — 99396 PREV VISIT EST AGE 40-64: CPT | Performed by: INTERNAL MEDICINE

## 2018-05-08 RX ORDER — CIPROFLOXACIN 250 MG/1
250 TABLET, FILM COATED ORAL 2 TIMES DAILY
Qty: 14 TAB | Refills: 0 | Status: SHIPPED | OUTPATIENT
Start: 2018-05-08 | End: 2018-05-16 | Stop reason: SDUPTHER

## 2018-05-08 ASSESSMENT — ENCOUNTER SYMPTOMS
DOUBLE VISION: 0
HEARTBURN: 0
WEIGHT LOSS: 0
CONSTIPATION: 0
PALPITATIONS: 0
BLURRED VISION: 0
NAUSEA: 0
DIZZINESS: 0
MYALGIAS: 0
ABDOMINAL PAIN: 0
BACK PAIN: 1
INSOMNIA: 0
SHORTNESS OF BREATH: 0
NERVOUS/ANXIOUS: 0
NECK PAIN: 0
VOMITING: 0
COUGH: 0
DEPRESSION: 0
DIARRHEA: 0

## 2018-05-08 ASSESSMENT — PATIENT HEALTH QUESTIONNAIRE - PHQ9: CLINICAL INTERPRETATION OF PHQ2 SCORE: 0

## 2018-05-08 NOTE — PROGRESS NOTES
Subjective:      Frank Fabian is a 52 y.o. female who presents with annual        HPI   Here for annual   Medication, allergies, medical history, surgical history, social history, family history reviewed and updated  Had pap last year  gyn was normal we need old records  meds and allergies reviewed  SH no tobacco, etoh none, no soda, one coffee per day  Does exercise three days per week does weight and stretching   Eye exam annually glasses reading  Hearing no changes  Teeth cleaning   FH mother passed away in [place] has sister in [place]          No current outpatient prescriptions on file.     No current facility-administered medications for this visit.      Patient Active Problem List   Diagnosis   • S/P appendectomy   • Preventative health care   • Low back pain   • Dyslipidemia   • History of depression   • Shoulder pain   • History of UTI   • Abdominal pain   • Ovarian cyst         abd pain  5/17/17 gyn ultrasound at [facility] per gyn  small mildly complex 1.9 cm left ovarian cyst  12/15/17 chronic right lower quadrant, right upper quadrant pain, labs ordered, CT abdomen and pelvis ordered, old records, gyn ultrasound pelvic from [facility] done in may, referral [facility]  12/22/217 cbc,cmp,lipase negative  1/9/18 CT abdomen and pelvis with done at [facility] imaging, prominent stool cecum and ascending colon suggesting constipation  1/19/18 colon per [facility] 4 mm polyp sigmoid colon, pathology post inflammatory polyp, follow-up 5 years based upon results     Dyslipidemia  10/10 chol 239,trig 71,hdl 81,ldl 144  7/12 chol 226,trig 53,hdl 73,ldl 142  10/16/12 chol 249,trig 74,hdl 91,ldl 143  9/17/13 chol 225,trig 66,hdl 76,ldl 136  10/11/13 chol 245,trig 74,hdl 82,ldl 148  11/22/14 chol 216,trig 60,hdl 81,ldl 123    11/25/15 chol 204,trig 54,hdl 76,ldl 117  7/30/16 chol 254,trig 66,hdl 92,ldl 149  2/13/17 chl 239,trig 53,hdl 96,ldl 132     History depression  Had been on prozac many yrs ago  12/2/10  start zoloft 50 mg  1/11 off zoloft  3/14/13 start zoloft    11/18/14 off zoloft     History UTI  3/14/13 UTI e.coli sensitive to all antibiotics  6/24/14 UTI e.coli    9/29/15 UTI symptoms self treated with cipro    8/25/17 UTI proteus sensitive to all antbiotics except nitrofurantoin, started on bactrim  9/1/17 use cipro prn UTI symptoms     Low back pain  11/09 MRI LS L4-5 disc reveals minimal disc bulging   12/21/16 MRI lumbar spine slight interval progression of lumbar spondylosis compared to 2009 per dr.albertson BERKOWITZ   2/6/17 has tried anti-inflammatories, prednisone, narcotics, physical therapy, acupuncture no benefit     Preventative health  9/12/13 pap negative  11/19/15 declines mammogram,flu,tdap  6/17/16 td in [place]  2/6/17 declines mammogram  2/13/17 vit d 22 start 4000 units daily  1/19/18 colon per [facility] 4 mm polyp sigmoid colon, pathology post inflammatory polyp, follow-up 5 years based upon results     Status post appendectomy     Shoulder pain  5/16/13 x-ray of shoulder minimal DTT acromioclavicular joint  5/16/13 MRI left shoulder moderate acromioclavicular edema suggesting grade 1 sprain or overuse, intact rotator cuff and labrum  9/23/13 saw [facility] no report yet, surgery recommended, would like second opinion, referral to  pending  2/26/14  ortho note; left a.c. joint injection for bursitis, repeat a.c. joint or possible subacromial or subcoracoid injection in the future if symptoms persist  9/22/16 dr.albertson BERKOWITZ note anterior right shoulder pain unclear etiology, referred for MRI  10/5/16 MRI right shoulder tendinosis supraspinatus, mild DJD right before meals with significant marrow edema and soft tissue edema, likely external impingement supraspinatus  12/20/16  orthopedic note persistent right shoulder pain despite steroid injection, right shoulder rotator cuff syndrome and before meals injury, possible subcoracoid impingement, offered repeat injection  versus arthroscopy, patient will consider       Depression Screening    Little interest or pleasure in doing things?  0 - not at all  Feeling down, depressed , or hopeless? 0 - not at all  Patient Health Questionnaire Score: 0       Health Maintenance Summary                IMM DTaP/Tdap/Td Vaccine Overdue 6/16/1986     PAP SMEAR Overdue 9/12/2016      Done 9/12/2013 PAP IG, CT-NG,RFXHPV ALL 16&18     Patient has more history with this topic...    MAMMOGRAM Overdue 11/19/2016      Patient Declined 11/19/2015      Patient has more history with this topic...    IMM INFLUENZA Next Due 9/1/2018     COLONOSCOPY Next Due 1/19/2023      Done 1/19/2018 REFERRAL TO GI FOR COLONOSCOPY          Patient Care Team:  Chago Fontenot M.D. as PCP - General (Internal Medicine)    Review of Systems   Constitutional: Negative for malaise/fatigue and weight loss.   HENT: Negative for hearing loss.    Eyes: Negative for blurred vision and double vision.   Respiratory: Negative for cough and shortness of breath.    Cardiovascular: Negative for chest pain and palpitations.   Gastrointestinal: Negative for abdominal pain, constipation, diarrhea, heartburn, nausea and vomiting.   Genitourinary: Negative for frequency and hematuria.   Musculoskeletal: Positive for back pain. Negative for myalgias and neck pain.   Skin: Negative for rash.   Neurological: Negative for dizziness.   Endo/Heme/Allergies: Negative for environmental allergies.   Psychiatric/Behavioral: Negative for depression. The patient is not nervous/anxious and does not have insomnia.     no nausea, vomiting, melena, hematochezia, abdominal pain, dysphagia, dysphasia, weight loss  Last menstrual cycle in January  Followed by gynecology  Occasional low back pain and right-sided no radiation down her legs, no numbness or tingling or sensory changes, no falls     Objective:        Physical Exam   Constitutional: She appears well-developed and well-nourished. No distress.   HENT:    Head: Normocephalic and atraumatic.   Right Ear: External ear normal.   Left Ear: External ear normal.   Mouth/Throat: Oropharynx is clear and moist.   Eyes: Conjunctivae are normal. Right eye exhibits no discharge. Left eye exhibits no discharge. No scleral icterus.   Neck: Neck supple. No JVD present. No thyromegaly present.   Cardiovascular: Normal rate, regular rhythm and normal heart sounds.    Pulmonary/Chest: Effort normal and breath sounds normal.   Abdominal: Soft. Bowel sounds are normal. She exhibits no distension. There is tenderness.   Mild left upper quadrant tenderness   Musculoskeletal: She exhibits no edema.   Neurological: She is alert.   Skin: Skin is warm. She is not diaphoretic.   Psychiatric: She has a normal mood and affect. Her behavior is normal.   Nursing note and vitals reviewed.  Abdomen no masses, no hepatosplenomegaly  Back no spinal tenderness  Extremity is no edema  Declines Pap smear done last year by gynecology  Declines mammogram done by gynecology in may       Skin lesions anterior and posterior thorax, 1-2 mm some raised wartlike lesions, some flat circumferential dark color in nature     Assessment/Plan:     Assessment  #! Annual exam     #2 Menopausal followed by gynecology    #3 occasional low back pain likely musculoskeletal    #4 left upper quadrant tenderness on exam no hepatosplenomegaly, no GI symptoms of nausea, vomiting, diarrhea, constipation, melena, or hematochezia , history of abdominal discomfort seen by GI, gynecology, CT abdomen and pelvis done in January suggesting constipation    #5 dyslipidemia diet-controlled    #6 low vitamin D not on supplementation    #7 skin lesions anterior and posterior thorax         Plan  #! Old records gyn     #2 declines mammogram    #3 declines US abdomen right upper quadrant pain    #4 declines dermatology referral recommended for evaluation of skin lesions, recommend use sunscreen    #5 take vitamin D daily    #6  labs preventative    #7 follow-up gynecology    #8 monitor if develops left upper quadrant pain to notify us    #9 Nutrition, diet, exercise discussed    #10 declines physical therapy for back discomfort likely musculoskeletal    #11 follow-up one year    #12 [place] no records

## 2018-05-11 ENCOUNTER — TELEPHONE (OUTPATIENT)
Dept: MEDICAL GROUP | Facility: MEDICAL CENTER | Age: 53
End: 2018-05-11

## 2018-05-11 LAB
25(OH)D3+25(OH)D2 SERPL-MCNC: 31.1 NG/ML (ref 30–100)
ALBUMIN SERPL-MCNC: 4.1 G/DL (ref 3.5–5.5)
ALBUMIN/GLOB SERPL: 1.6 {RATIO} (ref 1.2–2.2)
ALP SERPL-CCNC: 63 IU/L (ref 39–117)
ALT SERPL-CCNC: 29 IU/L (ref 0–32)
APPEARANCE UR: CLEAR
AST SERPL-CCNC: 22 IU/L (ref 0–40)
BASOPHILS # BLD AUTO: 0 X10E3/UL (ref 0–0.2)
BASOPHILS NFR BLD AUTO: 1 %
BILIRUB SERPL-MCNC: 1.2 MG/DL (ref 0–1.2)
BILIRUB UR QL STRIP: NEGATIVE
BUN SERPL-MCNC: 17 MG/DL (ref 6–24)
BUN/CREAT SERPL: 22 (ref 9–23)
CALCIUM SERPL-MCNC: 9 MG/DL (ref 8.7–10.2)
CHLORIDE SERPL-SCNC: 102 MMOL/L (ref 96–106)
CHOLEST SERPL-MCNC: 263 MG/DL (ref 100–199)
CO2 SERPL-SCNC: 22 MMOL/L (ref 18–29)
COLOR UR: YELLOW
CREAT SERPL-MCNC: 0.77 MG/DL (ref 0.57–1)
EOSINOPHIL # BLD AUTO: 0.2 X10E3/UL (ref 0–0.4)
EOSINOPHIL NFR BLD AUTO: 3 %
ERYTHROCYTE [DISTWIDTH] IN BLOOD BY AUTOMATED COUNT: 13.9 % (ref 12.3–15.4)
GFR SERPLBLD CREATININE-BSD FMLA CKD-EPI: 104 ML/MIN/1.73
GFR SERPLBLD CREATININE-BSD FMLA CKD-EPI: 90 ML/MIN/1.73
GLOBULIN SER CALC-MCNC: 2.6 G/DL (ref 1.5–4.5)
GLUCOSE SERPL-MCNC: 86 MG/DL (ref 65–99)
GLUCOSE UR QL: NEGATIVE
HBA1C MFR BLD: 5.1 % (ref 4.8–5.6)
HCT VFR BLD AUTO: 44.3 % (ref 34–46.6)
HDLC SERPL-MCNC: 96 MG/DL
HGB BLD-MCNC: 14.5 G/DL (ref 11.1–15.9)
HGB UR QL STRIP: NEGATIVE
IMM GRANULOCYTES # BLD: 0 X10E3/UL (ref 0–0.1)
IMM GRANULOCYTES NFR BLD: 0 %
IMMATURE CELLS  115398: NORMAL
KETONES UR QL STRIP: NEGATIVE
LABORATORY COMMENT REPORT: ABNORMAL
LDLC SERPL CALC-MCNC: 152 MG/DL (ref 0–99)
LEUKOCYTE ESTERASE UR QL STRIP: NEGATIVE
LYMPHOCYTES # BLD AUTO: 1.6 X10E3/UL (ref 0.7–3.1)
LYMPHOCYTES NFR BLD AUTO: 33 %
MCH RBC QN AUTO: 29.8 PG (ref 26.6–33)
MCHC RBC AUTO-ENTMCNC: 32.7 G/DL (ref 31.5–35.7)
MCV RBC AUTO: 91 FL (ref 79–97)
MICRO URNS: NORMAL
MONOCYTES # BLD AUTO: 0.4 X10E3/UL (ref 0.1–0.9)
MONOCYTES NFR BLD AUTO: 8 %
MORPHOLOGY BLD-IMP: NORMAL
NEUTROPHILS # BLD AUTO: 2.7 X10E3/UL (ref 1.4–7)
NEUTROPHILS NFR BLD AUTO: 55 %
NITRITE UR QL STRIP: NEGATIVE
NRBC BLD AUTO-RTO: NORMAL %
PH UR STRIP: 6.5 [PH] (ref 5–7.5)
PLATELET # BLD AUTO: 307 X10E3/UL (ref 150–379)
POTASSIUM SERPL-SCNC: 4.3 MMOL/L (ref 3.5–5.2)
PROT SERPL-MCNC: 6.7 G/DL (ref 6–8.5)
PROT UR QL STRIP: NEGATIVE
RBC # BLD AUTO: 4.86 X10E6/UL (ref 3.77–5.28)
SODIUM SERPL-SCNC: 140 MMOL/L (ref 134–144)
SP GR UR: 1.02 (ref 1–1.03)
TRIGL SERPL-MCNC: 75 MG/DL (ref 0–149)
TSH SERPL DL<=0.005 MIU/L-ACNC: 2.39 UIU/ML (ref 0.45–4.5)
URINALYSIS REFLEX  377202: NORMAL
UROBILINOGEN UR STRIP-MCNC: 0.2 MG/DL (ref 0.2–1)
VLDLC SERPL CALC-MCNC: 15 MG/DL (ref 5–40)
WBC # BLD AUTO: 4.8 X10E3/UL (ref 3.4–10.8)

## 2018-05-16 RX ORDER — CIPROFLOXACIN 250 MG/1
250 TABLET, FILM COATED ORAL 2 TIMES DAILY
Qty: 14 TAB | Refills: 0 | Status: SHIPPED | OUTPATIENT
Start: 2018-05-16 | End: 2019-10-06

## 2018-10-30 NOTE — TELEPHONE ENCOUNTER
1. Caller Name: Frank Fabian                                         Call Back Number: [phone]       Patient approves a detailed voicemail message: yes    Pt called to request copies of her imaging orders as well as to have an order placed for a Colonoscopy. Pt does not want to see [facility] physicians and would like to just go into a facility to have the procedure done as screening. Pt asking how to go about doing this.     I mailed copies of Mammo and CT to Pt's home address.     
LM with Barak @ [facility] to change REF to preventative colonoscopy screening only.   
Left message for patient to call back at [phone].    
PT STATES:  [facility] told her this yesterday    Because pt has no pain or symptoms, you have to send the REF as a preventative and then they will not make her do the consult with a physician.   
Please call [facility] and have them change the referral to gastroenterology that is already in the system to: preventative for colon cancer screening only  
Please notify patient she needs to see one of the gastroenterologists first, to be evaluated for a screening colonoscopy.  She cannot just walk in and have that procedure done  
Please notify the patient that one of the reasons we are referring her to the gastroenterologist is for her chronic right lower abdominal pain.  If she wants to be seen and evaluated for that she needs to see the gastroenterologist not as a preventative health screening appointment.    If she does not want to see the gastroenterologist regarding the right lower abdominal pain, then we will have to change the referral to just a screening colonoscopy.    If this is what she wants, let me know and contact [facility] to change the referral to gastroenterology for just a colon cancer screening referral.  
Pt says that coding as a preventative is the only way to get out of paying the co pay. If you will not code it like that, she will not go.   
fall precautions/aspiration precautions

## 2019-07-15 ENCOUNTER — OFFICE VISIT (OUTPATIENT)
Dept: MEDICAL GROUP | Facility: MEDICAL CENTER | Age: 54
End: 2019-07-15
Payer: COMMERCIAL

## 2019-07-15 VITALS
WEIGHT: 124 LBS | OXYGEN SATURATION: 99 % | BODY MASS INDEX: 19.46 KG/M2 | SYSTOLIC BLOOD PRESSURE: 102 MMHG | TEMPERATURE: 99 F | HEART RATE: 65 BPM | HEIGHT: 67 IN | DIASTOLIC BLOOD PRESSURE: 68 MMHG

## 2019-07-15 DIAGNOSIS — R23.2 HOT FLASHES: ICD-10-CM

## 2019-07-15 DIAGNOSIS — Z00.00 PREVENTATIVE HEALTH CARE: Chronic | ICD-10-CM

## 2019-07-15 PROCEDURE — 99396 PREV VISIT EST AGE 40-64: CPT | Performed by: INTERNAL MEDICINE

## 2019-07-15 ASSESSMENT — ENCOUNTER SYMPTOMS
BLURRED VISION: 0
HEARTBURN: 0
ABDOMINAL PAIN: 0
SPUTUM PRODUCTION: 0
CONSTIPATION: 0
WEIGHT LOSS: 0
DIARRHEA: 0
BACK PAIN: 0
COUGH: 0
HEADACHES: 0
VOMITING: 0
DEPRESSION: 0
DOUBLE VISION: 0
MYALGIAS: 0
INSOMNIA: 0
PALPITATIONS: 0

## 2019-07-15 ASSESSMENT — PATIENT HEALTH QUESTIONNAIRE - PHQ9: CLINICAL INTERPRETATION OF PHQ2 SCORE: 0

## 2019-07-15 NOTE — PROGRESS NOTES
Subjective:      Frank Fabian is a 54 y.o. female who presents with Annual Exam (PAP  if nec. )            HPI   Here for annual exam   Medications, allergies, medical history, surgical history, social history, family is reviewed and updated  Eye exam annually uses glasses for reading   Teeth cleaning twice per year  Use sunscreen   Last pap 5/10/17 per    Has hot flashes more frequently, last menses in january, occasional mood swings no depression  SH , exercise 4 days per week, cardio and weights and yoga, eats healthy diet, avoids processed foods as much as possible, drinks one liter water daily, tea and coffee one per day, no soda, sleep is good 7-8 hours but hot flashes may bother her,  works at "[employer]          Current Outpatient Prescriptions   Medication Sig Dispense Refill   • ciprofloxacin (CIPRO) 250 MG Tab Take 1 Tab by mouth 2 times a day. 14 Tab 0     No current facility-administered medications for this visit.      abd pain  5/17/17 gyn ultrasound at [facility] per gyn  small mildly complex 1.9 cm left ovarian cyst  12/15/17 chronic right lower quadrant, right upper quadrant pain, labs ordered, CT abdomen and pelvis ordered, old records, gyn ultrasound pelvic from [facility] done in may, referral [facility]  12/22/217 cbc,cmp,lipase negative  1/9/18 CT abdomen and pelvis with done at [facility] imaging, prominent stool cecum and ascending colon suggesting constipation  1/19/18 colon per [facility] 4 mm polyp sigmoid colon, pathology post inflammatory polyp, follow-up 5 years based upon results      Dyslipidemia  10/10 chol 239,trig 71,hdl 81,ldl 144  7/12 chol 226,trig 53,hdl 73,ldl 142  10/16/12 chol 249,trig 74,hdl 91,ldl 143  9/17/13 chol 225,trig 66,hdl 76,ldl 136  10/11/13 chol 245,trig 74,hdl 82,ldl 148  11/22/14 chol 216,trig 60,hdl 81,ldl 123    11/25/15 chol 204,trig 54,hdl 76,ldl 117  7/30/16 chol 254,trig 66,hdl 92,ldl 149  2/13/17 chl 239,trig 53,hdl 96,ldl 132  5/11/18 chol  263,trig 96,hdl 96,ldl 152     History depression  Had been on prozac many yrs ago  12/2/10 start zoloft 50 mg  1/11 off zoloft  3/14/13 start zoloft    11/18/14 off zoloft     History UTI  3/14/13 UTI e.coli sensitive to all antibiotics  6/24/14 UTI e.coli    9/29/15 UTI symptoms self treated with cipro    8/25/17 UTI proteus sensitive to all antbiotics except nitrofurantoin, started on bactrim  9/1/17 use cipro prn UTI symptoms  5/11/18 UA negative      Low back pain  11/09 MRI LS L4-5 disc reveals minimal disc bulging   12/21/16 MRI lumbar spine slight interval progression of lumbar spondylosis compared to 2009 per dr.albertson BERKOWITZ   2/6/17 has tried anti-inflammatories, prednisone, narcotics, physical therapy, acupuncture no benefit     Preventative health  11/19/15 declines mammogram,flu,tdap  6/17/16 td in [place]  2/6/17 declines mammogram  5/10/17 pap per  gyn  1/19/18 colon per [facility] 4 mm polyp sigmoid colon, pathology post inflammatory polyp, follow-up 5 years based upon results  5/8/18 declines mammogram  5/11/18 vit d 31 on 4000 units  11/27/18  eye exam     Status post appendectomy     Shoulder pain  5/16/13 x-ray of shoulder minimal DTT acromioclavicular joint  5/16/13 MRI left shoulder moderate acromioclavicular edema suggesting grade 1 sprain or overuse, intact rotator cuff and labrum  9/23/13 saw [facility] no report yet, surgery recommended, would like second opinion, referral to  pending  2/26/14  ortho note; left a.c. joint injection for bursitis, repeat a.c. joint or possible subacromial or subcoracoid injection in the future if symptoms persist  9/22/16 dr.albertson BERKOWITZ note anterior right shoulder pain unclear etiology, referred for MRI  10/5/16 MRI right shoulder tendinosis supraspinatus, mild DJD right before meals with significant marrow edema and soft tissue edema, likely external impingement supraspinatus  12/20/16  orthopedic note  persistent right shoulder pain despite steroid injection, right shoulder rotator cuff syndrome and before meals injury, possible subcoracoid impingement, offered repeat injection versus arthroscopy, patient will consider         Patient Active Problem List   Diagnosis   • S/P appendectomy   • Preventative health care   • Low back pain   • Dyslipidemia   • History of depression   • Shoulder pain   • History of UTI   • Abdominal pain   • Ovarian cyst       Review of Systems   Constitutional: Negative for malaise/fatigue and weight loss.   HENT: Negative for hearing loss.    Eyes: Negative for blurred vision and double vision.   Respiratory: Negative for cough and sputum production.    Cardiovascular: Negative for chest pain and palpitations.   Gastrointestinal: Negative for abdominal pain, constipation, diarrhea, heartburn and vomiting.   Genitourinary: Negative for dysuria and frequency.   Musculoskeletal: Negative for back pain and myalgias.   Skin: Negative for rash.   Neurological: Negative for headaches.   Endo/Heme/Allergies: Negative for environmental allergies.   Psychiatric/Behavioral: Negative for depression. The patient does not have insomnia.        Depression Screening    Little interest or pleasure in doing things?  0 - not at all  Feeling down, depressed , or hopeless? 0 - not at all  Patient Health Questionnaire Score: 0          Health Maintenance Summary                IMM DTaP/Tdap/Td Vaccine Overdue 6/16/1986     MAMMOGRAM Overdue 11/19/2016      Patient Declined 11/19/2015      Patient has more history with this topic...    IMM ZOSTER VACCINES Overdue 6/16/2017     IMM INFLUENZA Next Due 9/1/2019     PAP SMEAR Next Due 5/10/2020      Done 5/10/2017 PAP IG (IMAGE GUIDED)     Patient has more history with this topic...    COLONOSCOPY Next Due 1/19/2023      Done 1/19/2018 REFERRAL TO GI FOR COLONOSCOPY          Patient Care Team:  Chago Fontenot M.D. as PCP - General (Internal Medicine)        Objective:        Physical Exam   Constitutional: She appears well-developed and well-nourished. No distress.   HENT:   Head: Normocephalic and atraumatic.   Right Ear: External ear normal.   Left Ear: External ear normal.   Mouth/Throat: Oropharynx is clear and moist.   Eyes: Conjunctivae are normal. Right eye exhibits no discharge. Left eye exhibits no discharge.   Neck: Neck supple. No JVD present. No thyromegaly present.   Cardiovascular: Normal rate, regular rhythm and normal heart sounds.    Pulmonary/Chest: Effort normal and breath sounds normal. No respiratory distress. She has no wheezes.   Abdominal: She exhibits no distension.   Musculoskeletal: She exhibits no edema.   Neurological: She is alert.   Skin: Skin is warm. She is not diaphoretic.   Psychiatric: She has a normal mood and affect. Her behavior is normal.   Nursing note and vitals reviewed.              Assessment/Plan:     Assessment  #! Annual     #2 Dyslipidemia 5/11/18 chol 263,trig 96,hdl 96,ldl 152        #3 Preventative health  11/19/15 declines mammogram,flu,tdap  6/17/16 td in [place]  2/6/17 declines mammogram  5/10/17 pap per  gyn  1/19/18 colon per [facility] 4 mm polyp sigmoid colon, pathology post inflammatory polyp, follow-up 5 years based upon results  5/8/18 declines mammogram  5/11/18 vit d 31 on 4000 units  11/27/18  eye exam    #4 hot flashes likely postmenopausal/perimenopausal    Plan  #!  Declines mammogram    #2 declines shingles vaccine    #3 had reaction previously to influenza vaccine    #4 had colonoscopy last year    #5 follow-up with gynecology next year    #6 labs    #7 nutrition, diet, exercise discussed    #8 different options for postmenopausal symptoms discussed, HRT, SSRI, gabapentin, declines for now we will monitor and notify us if symptoms worsen    #9 follow-up 1 year    #10 will be having labs done at [facility], if she does not hear from us 1 to 2 days after completing the blood test, please let  us know, [facility]

## 2019-07-23 LAB
25(OH)D3+25(OH)D2 SERPL-MCNC: 34.5 NG/ML (ref 30–100)
ALBUMIN SERPL-MCNC: 4.5 G/DL (ref 3.5–5.5)
ALBUMIN/GLOB SERPL: 1.7 {RATIO} (ref 1.2–2.2)
ALP SERPL-CCNC: 66 IU/L (ref 39–117)
ALT SERPL-CCNC: 11 IU/L (ref 0–32)
APPEARANCE UR: ABNORMAL
AST SERPL-CCNC: 16 IU/L (ref 0–40)
BACTERIA #/AREA URNS HPF: ABNORMAL /[HPF]
BASOPHILS # BLD AUTO: 0 X10E3/UL (ref 0–0.2)
BASOPHILS NFR BLD AUTO: 1 %
BILIRUB SERPL-MCNC: 1.3 MG/DL (ref 0–1.2)
BILIRUB UR QL STRIP: NEGATIVE
BUN SERPL-MCNC: 15 MG/DL (ref 6–24)
BUN/CREAT SERPL: 16 (ref 9–23)
CALCIUM SERPL-MCNC: 9.5 MG/DL (ref 8.7–10.2)
CASTS URNS MICRO: ABNORMAL
CASTS URNS QL MICRO: ABNORMAL
CHLORIDE SERPL-SCNC: 102 MMOL/L (ref 96–106)
CHOLEST SERPL-MCNC: 269 MG/DL (ref 100–199)
CO2 SERPL-SCNC: 25 MMOL/L (ref 20–29)
COLOR UR: YELLOW
CREAT SERPL-MCNC: 0.95 MG/DL (ref 0.57–1)
CRYSTALS URNS MICRO: ABNORMAL
EOSINOPHIL # BLD AUTO: 0.1 X10E3/UL (ref 0–0.4)
EOSINOPHIL NFR BLD AUTO: 3 %
EPI CELLS #/AREA URNS HPF: ABNORMAL /HPF
ERYTHROCYTE [DISTWIDTH] IN BLOOD BY AUTOMATED COUNT: 13.5 % (ref 12.3–15.4)
ESTRADIOL SERPL-MCNC: 7.7 PG/ML
FSH SERPL-ACNC: 92.9 MIU/ML
GLOBULIN SER CALC-MCNC: 2.6 G/DL (ref 1.5–4.5)
GLUCOSE SERPL-MCNC: 96 MG/DL (ref 65–99)
GLUCOSE UR QL: NEGATIVE
HBA1C MFR BLD: 5.5 % (ref 4.8–5.6)
HCT VFR BLD AUTO: 44.2 % (ref 34–46.6)
HDLC SERPL-MCNC: 99 MG/DL
HGB BLD-MCNC: 14.7 G/DL (ref 11.1–15.9)
HGB UR QL STRIP: NEGATIVE
IMM GRANULOCYTES # BLD AUTO: 0 X10E3/UL (ref 0–0.1)
IMM GRANULOCYTES NFR BLD AUTO: 0 %
IMMATURE CELLS  115398: NORMAL
KETONES UR QL STRIP: NEGATIVE
LABORATORY COMMENT REPORT: ABNORMAL
LDLC SERPL CALC-MCNC: 157 MG/DL (ref 0–99)
LEUKOCYTE ESTERASE UR QL STRIP: NEGATIVE
LH SERPL-ACNC: 40.5 MIU/ML
LYMPHOCYTES # BLD AUTO: 1.8 X10E3/UL (ref 0.7–3.1)
LYMPHOCYTES NFR BLD AUTO: 42 %
MCH RBC QN AUTO: 29.5 PG (ref 26.6–33)
MCHC RBC AUTO-ENTMCNC: 33.3 G/DL (ref 31.5–35.7)
MCV RBC AUTO: 89 FL (ref 79–97)
MICRO URNS: ABNORMAL
MICRO URNS: ABNORMAL
MONOCYTES # BLD AUTO: 0.3 X10E3/UL (ref 0.1–0.9)
MONOCYTES NFR BLD AUTO: 8 %
MORPHOLOGY BLD-IMP: NORMAL
MUCOUS THREADS URNS QL MICRO: PRESENT
NEUTROPHILS # BLD AUTO: 2.1 X10E3/UL (ref 1.4–7)
NEUTROPHILS NFR BLD AUTO: 46 %
NITRITE UR QL STRIP: NEGATIVE
NRBC BLD AUTO-RTO: NORMAL %
PH UR STRIP: 7.5 [PH] (ref 5–7.5)
PLATELET # BLD AUTO: 269 X10E3/UL (ref 150–450)
POTASSIUM SERPL-SCNC: 4.7 MMOL/L (ref 3.5–5.2)
PROT SERPL-MCNC: 7.1 G/DL (ref 6–8.5)
PROT UR QL STRIP: NEGATIVE
RBC # BLD AUTO: 4.98 X10E6/UL (ref 3.77–5.28)
RBC #/AREA URNS HPF: ABNORMAL /HPF
RENAL EPI CELLS #/AREA URNS HPF: ABNORMAL /[HPF]
SODIUM SERPL-SCNC: 141 MMOL/L (ref 134–144)
SP GR UR: 1.02 (ref 1–1.03)
T VAGINALIS URNS QL MICRO: ABNORMAL
TRIGL SERPL-MCNC: 64 MG/DL (ref 0–149)
TSH SERPL DL<=0.005 MIU/L-ACNC: 1.98 UIU/ML (ref 0.45–4.5)
UNIDENT CRYS URNS QL MICRO: PRESENT
URINALYSIS REFLEX  377202: ABNORMAL
URNS CMNT MICRO: ABNORMAL
UROBILINOGEN UR STRIP-MCNC: 0.2 MG/DL (ref 0.2–1)
VLDLC SERPL CALC-MCNC: 13 MG/DL (ref 5–40)
WBC # BLD AUTO: 4.4 X10E3/UL (ref 3.4–10.8)
WBC #/AREA URNS HPF: ABNORMAL /HPF
YEAST #/AREA URNS HPF: ABNORMAL /[HPF]

## 2019-08-04 ENCOUNTER — TELEPHONE (OUTPATIENT)
Dept: MEDICAL GROUP | Facility: MEDICAL CENTER | Age: 54
End: 2019-08-04

## 2019-08-04 PROBLEM — N95.1 PERIMENOPAUSAL: Status: ACTIVE | Noted: 2019-08-04

## 2019-08-04 RX ORDER — SERTRALINE HYDROCHLORIDE 25 MG/1
25 TABLET, FILM COATED ORAL DAILY
Qty: 30 TAB | Refills: 2 | Status: SHIPPED | OUTPATIENT
Start: 2019-08-04 | End: 2019-10-06 | Stop reason: SDUPTHER

## 2019-10-06 RX ORDER — SERTRALINE HYDROCHLORIDE 25 MG/1
25 TABLET, FILM COATED ORAL DAILY
Qty: 90 TAB | Refills: 1 | Status: SHIPPED | OUTPATIENT
Start: 2019-10-06 | End: 2021-01-14

## 2020-03-24 ENCOUNTER — TELEPHONE (OUTPATIENT)
Dept: HEALTH INFORMATION MANAGEMENT | Facility: OTHER | Age: 55
End: 2020-03-24

## 2020-03-24 NOTE — TELEPHONE ENCOUNTER
1. Caller Name: Frank Fabian                      Call Back Number: [phone]  [facility] PCP or Specialty Provider: Laly Fontenot        2.  Does patient have any active symptoms of respiratory illness (fever OR cough OR shortness of breath OR sore throat)? Yes, the patient reports the following respiratory symptoms: fever of at least 100.4°F (38°C) or greater and cough.    3.  Does patient have any comoribidities? None     4.  Has the patient traveled in the last 14 days OR had any known contact with someone who is suspected or confirmed to have COVID-19?  No.    5. Disposition: Advised to schedule with their insurance's preferred virtual visit provider to limit potential exposure to others;   [facility]:   [facility] [phone]  or  [facility] [phone]     Note routed to [facility] Provider:   [facility] only.   Pt has cough for 2 days and fever of 101 since this am. Advised to call teledoc and numbers given if pt feels she needs care.

## 2020-05-04 ENCOUNTER — HOSPITAL ENCOUNTER (OUTPATIENT)
Dept: LAB | Facility: MEDICAL CENTER | Age: 55
End: 2020-05-04
Attending: OBSTETRICS & GYNECOLOGY
Payer: COMMERCIAL

## 2020-05-04 LAB — CYTOLOGY REG CYTOL: NORMAL

## 2020-05-04 PROCEDURE — 88175 CYTOPATH C/V AUTO FLUID REDO: CPT

## 2020-05-11 ENCOUNTER — TELEMEDICINE (OUTPATIENT)
Dept: MEDICAL GROUP | Facility: MEDICAL CENTER | Age: 55
End: 2020-05-11
Payer: COMMERCIAL

## 2020-05-11 VITALS
DIASTOLIC BLOOD PRESSURE: 62 MMHG | BODY MASS INDEX: 18.99 KG/M2 | WEIGHT: 121 LBS | SYSTOLIC BLOOD PRESSURE: 112 MMHG | HEIGHT: 67 IN

## 2020-05-11 DIAGNOSIS — M81.0 POSTMENOPAUSAL BONE LOSS: ICD-10-CM

## 2020-05-11 DIAGNOSIS — N95.1 PERIMENOPAUSAL: ICD-10-CM

## 2020-05-11 DIAGNOSIS — E78.5 DYSLIPIDEMIA: ICD-10-CM

## 2020-05-11 DIAGNOSIS — Z00.00 PREVENTATIVE HEALTH CARE: ICD-10-CM

## 2020-05-11 DIAGNOSIS — Z86.59 HISTORY OF DEPRESSION: ICD-10-CM

## 2020-05-11 PROCEDURE — 99396 PREV VISIT EST AGE 40-64: CPT | Mod: 95,CR | Performed by: INTERNAL MEDICINE

## 2020-05-11 ASSESSMENT — FIBROSIS 4 INDEX: FIB4 SCORE: 0.93

## 2020-05-11 NOTE — PROGRESS NOTES
Telemedicine Visit: Established Patient     This encounter was conducted via zoom  Verbal consent was obtained. Patient's identity was verified.    Subjective:   CC: annual exam  Frank Fabian is a 54 y.o. female presenting for evaluation and management of: annual exam  Medication, allergies, medical history, surgical history, social history, family history reviewed and updated  Sees  gyn  Eye exam annually glasses for reading and computer  Teeth cleaning twice per year  SH  remotely from home for IGT and keeps active and still runs outdoors 2 miles at a time 5 out of 7 days per week, tries to follow a healthy diet. No tobacco, no etoh. Drinks coffee one cup in the am, no tobacco  Drinks tea during the day, no soda   Tries to eat healthy and avoid sweets   Practices social distancing measures  Sleep will have hot flashes at night and did not use the zoloft with no mood changes  No chest pain or sob  No hay fever symptoms  No gerd, no constipation, no blood in stools  No dysuria, no incontinence with running   No joint pain       No Known Allergies    Current medicines (including changes today)  Current Outpatient Medications   Medication Sig Dispense Refill   • sertraline (ZOLOFT) 25 MG tablet Take 1 Tab by mouth every day. (Patient not taking: Reported on 5/11/2020) 90 Tab 1     No current facility-administered medications for this visit.        Patient Active Problem List    Diagnosis Date Noted   • Perimenopausal 08/04/2019   • History abdominal pain 05/05/2018   • Ovarian cyst 05/05/2018   • History of UTI 09/29/2015   • History shoulder pain 05/16/2013   • History of depression 12/02/2010   • S/P appendectomy 11/09/2010   • Preventative health care 11/09/2010   • Low back pain 11/09/2010   • Dyslipidemia 11/09/2010           Dyslipidemia  10/10 chol 239,trig 71,hdl 81,ldl 144  7/12 chol 226,trig 53,hdl 73,ldl 142  10/16/12 chol 249,trig 74,hdl 91,ldl 143  9/17/13 chol 225,trig  66,hdl 76,ldl 136  10/11/13 chol 245,trig 74,hdl 82,ldl 148  11/22/14 chol 216,trig 60,hdl 81,ldl 123    11/25/15 chol 204,trig 54,hdl 76,ldl 117  7/30/16 chol 254,trig 66,hdl 92,ldl 149  2/13/17 chl 239,trig 53,hdl 96,ldl 132  5/11/18 chol 263,trig 96,hdl 96,ldl 152  7/24/19 chol 269,trig 64,hdl 99,ldl 157; 10 year risk 0.7%    history abd pain  5/17/17 gyn ultrasound at [facility] per gyn  small mildly complex 1.9 cm left ovarian cyst  12/15/17 chronic right lower quadrant, right upper quadrant pain, labs ordered, CT abdomen and pelvis ordered, old records, gyn ultrasound pelvic from [facility] done in may, referral [facility]  12/22/217 cbc,cmp,lipase negative  1/9/18 CT abdomen and pelvis with done at [facility] imaging, prominent stool cecum and ascending colon suggesting constipation  1/19/18 colon per [facility] 4 mm polyp sigmoid colon, pathology post inflammatory polyp, follow-up 5 years based upon results     History depression  Had been on prozac many yrs ago  12/2/10 start zoloft 50 mg  1/11 off zoloft  3/14/13 start zoloft    11/18/14 off zoloft    history shoulder pain  5/16/13 x-ray of shoulder minimal DTT acromioclavicular joint  5/16/13 MRI left shoulder moderate acromioclavicular edema suggesting grade 1 sprain or overuse, intact rotator cuff and labrum  9/23/13 saw [facility] no report yet, surgery recommended, would like second opinion, referral to  pending  2/26/14  ortho note; left a.c. joint injection for bursitis, repeat a.c. joint or possible subacromial or subcoracoid injection in the future if symptoms persist  9/22/16 dr.albertson BERKOWITZ note anterior right shoulder pain unclear etiology, referred for MRI  10/5/16 MRI right shoulder tendinosis supraspinatus, mild DJD right before meals with significant marrow edema and soft tissue edema, likely external impingement supraspinatus  12/20/16  orthopedic note persistent right shoulder pain despite steroid injection, right shoulder  rotator cuff syndrome and before meals injury, possible subcoracoid impingement, offered repeat injection versus arthroscopy, patient will consider     History UTI  3/14/13 UTI e.coli sensitive to all antibiotics  6/24/14 UTI e.coli    9/29/15 UTI symptoms self treated with cipro    8/25/17 UTI proteus sensitive to all antbiotics except nitrofurantoin, started on bactrim  9/1/17 use cipro prn UTI symptoms  5/11/18 UA negative  7/24/19 UA negative      Low back pain  11/09 MRI LS L4-5 disc reveals minimal disc bulging   12/21/16 MRI lumbar spine slight interval progression of lumbar spondylosis compared to 2009 per  [facility]   2/6/17 has tried anti-inflammatories, prednisone, narcotics, physical therapy, acupuncture no benefit    Perimenopausal  7/15/19 labs ordered, hot flashes, declines HRT or SSRI, monitor symptoms likely postmenopausal  7/24/19 estradiol 7.7,LH 40,FSH 93  8/4/19 now agrees to SSRI trial via [employer], will start zoloft 25 mg     [facility]  6/17/16 td in [place]  5/10/17 pap per  gyn  1/19/18 colon per [facility] 4 mm polyp sigmoid colon, pathology post inflammatory polyp, follow-up 5 years based upon results  11/27/18  eye exam  7/15/19 declines mammogram  7/15/19 declines vaccines  7/24/19 vit d 34  7/24/19 estradiol 7.7,LH 40,FSH 93     Status post appendectomy            Family History   Problem Relation Age of Onset   • Hyperlipidemia Mother         aortic stenosis   • Heart Disease Father         AVR and CABGx2    • Heart Disease Sister         mitral valve       She  has a past medical history of Preventative health care (11/9/2010).  She  has a past surgical history that includes primary c section and appendectomy.       Objective:   Vitals obtained by patient:                      Dyslipidemia  10/10 chol 239,trig 71,hdl 81,ldl 144  7/12 chol 226,trig 53,hdl 73,ldl 142  10/16/12 chol 249,trig 74,hdl 91,ldl 143  9/17/13 chol 225,trig 66,hdl 76,ldl  136  10/11/13 chol 245,trig 74,hdl 82,ldl 148  11/22/14 chol 216,trig 60,hdl 81,ldl 123    11/25/15 chol 204,trig 54,hdl 76,ldl 117  7/30/16 chol 254,trig 66,hdl 92,ldl 149  2/13/17 chl 239,trig 53,hdl 96,ldl 132  5/11/18 chol 263,trig 96,hdl 96,ldl 152  7/24/19 chol 269,trig 64,hdl 99,ldl 157; 10 year risk 0.7%    history abd pain  5/17/17 gyn ultrasound at [facility] per gyn  small mildly complex 1.9 cm left ovarian cyst  12/15/17 chronic right lower quadrant, right upper quadrant pain, labs ordered, CT abdomen and pelvis ordered, old records, gyn ultrasound pelvic from [facility]'s done in may, referral [facility]  12/22/217 cbc,cmp,lipase negative  1/9/18 CT abdomen and pelvis with done at [facility] imaging, prominent stool cecum and ascending colon suggesting constipation  1/19/18 colon per [facility] 4 mm polyp sigmoid colon, pathology post inflammatory polyp, follow-up 5 years based upon results     History depression  Had been on prozac many yrs ago  12/2/10 start zoloft 50 mg  1/11 off zoloft  3/14/13 start zoloft    11/18/14 off zoloft    history shoulder pain  5/16/13 x-ray of shoulder minimal DTT acromioclavicular joint  5/16/13 MRI left shoulder moderate acromioclavicular edema suggesting grade 1 sprain or overuse, intact rotator cuff and labrum  9/23/13 saw [facility] no report yet, surgery recommended, would like second opinion, referral to  pending  2/26/14  ortho note; left a.c. joint injection for bursitis, repeat a.c. joint or possible subacromial or subcoracoid injection in the future if symptoms persist  9/22/16 dr.albertson BERKOWITZ note anterior right shoulder pain unclear etiology, referred for MRI  10/5/16 MRI right shoulder tendinosis supraspinatus, mild DJD right before meals with significant marrow edema and soft tissue edema, likely external impingement supraspinatus  12/20/16  orthopedic note persistent right shoulder pain despite steroid injection, right shoulder rotator cuff  syndrome and before meals injury, possible subcoracoid impingement, offered repeat injection versus arthroscopy, patient will consider     History UTI  3/14/13 UTI e.coli sensitive to all antibiotics  6/24/14 UTI e.coli    9/29/15 UTI symptoms self treated with cipro    8/25/17 UTI proteus sensitive to all antbiotics except nitrofurantoin, started on bactrim  9/1/17 use cipro prn UTI symptoms  5/11/18 UA negative  7/24/19 UA negative      Low back pain  11/09 MRI LS L4-5 disc reveals minimal disc bulging   12/21/16 MRI lumbar spine slight interval progression of lumbar spondylosis compared to 2009 per  [facility]   2/6/17 has tried anti-inflammatories, prednisone, narcotics, physical therapy, acupuncture no benefit    Perimenopausal  7/15/19 labs ordered, hot flashes, declines HRT or SSRI, monitor symptoms likely postmenopausal  7/24/19 estradiol 7.7,LH 40,FSH 93  8/4/19 now agrees to SSRI trial via [employer], will start zoloft 25 mg     Preventative health  6/17/16 td in [place]  5/10/17 pap per  gyn  1/19/18 colon per [facility] 4 mm polyp sigmoid colon, pathology post inflammatory polyp, follow-up 5 years based upon results  11/27/18  eye exam  7/15/19 declines mammogram  7/15/19 declines vaccines  7/24/19 vit d 34  7/24/19 estradiol 7.7,LH 40,FSH 93  5/6/20 pap negative per      Status post appendectomy             Health Maintenance Summary                MAMMOGRAM Overdue 11/19/2016      Patient Declined 11/19/2015      Patient has more history with this topic...    IMM ZOSTER VACCINES Overdue 6/16/2017     IMM INFLUENZA Next Due 9/1/2020     COLONOSCOPY Next Due 1/19/2023      Done 1/19/2018 REFERRAL TO GI FOR COLONOSCOPY    PAP SMEAR Next Due 5/4/2023      Done 5/4/2020 PATHOLOGY GYNECOLOGY SPECIMEN     Patient has more history with this topic...    IMM DTaP/Tdap/Td Vaccine Next Due 5/24/2023      Done 5/24/2013 Ext Imm: Tdap          Patient Care Team:  Chago Fontenot  M.D. as PCP - General (Internal Medicine)      Physical Exam:   Constitutional: Alert, no distress, well-groomed.  Skin: No rashes in visible areas.  Eye: Round. Conjunctiva clear, lids normal. No icterus.   ENMT: Lips pink without lesions, good dentition, moist mucous membranes. Phonation normal.  Neck: No masses, no thyromegaly. Moves freely without pain.  Respiratory: Unlabored respiratory effort, no cough or audible wheeze  Psych: Alert and oriented x3, normal affect and mood.       Assessment and Plan:   The following treatment plan was discussed:   Assessment  #! Annual exam    #2 postmenopausal did not start Zoloft, still has some hot flashes followed by gynecology    #3 preventative health  6/17/16 td in [place]  1/19/18 colon per [facility] 4 mm polyp sigmoid colon, pathology post inflammatory polyp, follow-up 5 years based upon results  11/27/18  eye exam  7/15/19 declines vaccines  7/24/19 vit d 34  7/24/19 estradiol 7.7,LH 40,FSH 93  5/6/20 pap negative per   5/11/20 declines mammogram    #4 history of low back pain no recurrence    #5 dyslipidemia diet controlled, working on exercise, activity, eating healthy    Plan  #! Declines mammogram     #2 dexa postmenopausal, and high risk for fragility fracture, thin, postmenopausal, no HRT    #3 has had colon 1/19/18    #4 labs at [facility] will mail orders to the patient     #5 shingrix to check with insurance     #6 nutrition, diet, exercise discussed    #7 follow-up gynecology    #8 follow-up with us 1 year

## 2020-06-28 LAB
25(OH)D3+25(OH)D2 SERPL-MCNC: 32.3 NG/ML (ref 30–100)
ALBUMIN SERPL-MCNC: 4.7 G/DL (ref 3.8–4.9)
ALBUMIN/GLOB SERPL: 2 {RATIO} (ref 1.2–2.2)
ALP SERPL-CCNC: 75 IU/L (ref 39–117)
ALT SERPL-CCNC: 16 IU/L (ref 0–32)
APPEARANCE UR: CLEAR
AST SERPL-CCNC: 18 IU/L (ref 0–40)
BACTERIA #/AREA URNS HPF: NORMAL /[HPF]
BACTERIA UR CULT: NO GROWTH
BACTERIA UR CULT: NORMAL
BASOPHILS # BLD AUTO: 0 X10E3/UL (ref 0–0.2)
BASOPHILS NFR BLD AUTO: 1 %
BILIRUB SERPL-MCNC: 1.2 MG/DL (ref 0–1.2)
BILIRUB UR QL STRIP: NEGATIVE
BUN SERPL-MCNC: 18 MG/DL (ref 6–24)
BUN/CREAT SERPL: 22 (ref 9–23)
CALCIUM SERPL-MCNC: 9.4 MG/DL (ref 8.7–10.2)
CASTS URNS MICRO: NORMAL
CASTS URNS QL MICRO: NORMAL
CHLORIDE SERPL-SCNC: 102 MMOL/L (ref 96–106)
CHOLEST SERPL-MCNC: 278 MG/DL (ref 100–199)
CO2 SERPL-SCNC: 25 MMOL/L (ref 20–29)
COLOR UR: YELLOW
CREAT SERPL-MCNC: 0.82 MG/DL (ref 0.57–1)
CRYSTALS URNS MICRO: NORMAL
EOSINOPHIL # BLD AUTO: 0.1 X10E3/UL (ref 0–0.4)
EOSINOPHIL NFR BLD AUTO: 3 %
EPI CELLS #/AREA URNS HPF: NORMAL /HPF (ref 0–10)
ERYTHROCYTE [DISTWIDTH] IN BLOOD BY AUTOMATED COUNT: 12.2 % (ref 11.7–15.4)
GLOBULIN SER CALC-MCNC: 2.3 G/DL (ref 1.5–4.5)
GLUCOSE SERPL-MCNC: 91 MG/DL (ref 65–99)
GLUCOSE UR QL: NEGATIVE
HBA1C MFR BLD: 5.3 % (ref 4.8–5.6)
HCT VFR BLD AUTO: 45 % (ref 34–46.6)
HDLC SERPL-MCNC: 98 MG/DL
HGB BLD-MCNC: 14.8 G/DL (ref 11.1–15.9)
HGB UR QL STRIP: NEGATIVE
IMM GRANULOCYTES # BLD AUTO: 0 X10E3/UL (ref 0–0.1)
IMM GRANULOCYTES NFR BLD AUTO: 0 %
IMMATURE CELLS  115398: NORMAL
KETONES UR QL STRIP: NEGATIVE
LABORATORY COMMENT REPORT: ABNORMAL
LDLC SERPL CALC-MCNC: 166 MG/DL (ref 0–99)
LEUKOCYTE ESTERASE UR QL STRIP: ABNORMAL
LYMPHOCYTES # BLD AUTO: 1.4 X10E3/UL (ref 0.7–3.1)
LYMPHOCYTES NFR BLD AUTO: 33 %
MCH RBC QN AUTO: 30.3 PG (ref 26.6–33)
MCHC RBC AUTO-ENTMCNC: 32.9 G/DL (ref 31.5–35.7)
MCV RBC AUTO: 92 FL (ref 79–97)
MICRO URNS: ABNORMAL
MICRO URNS: ABNORMAL
MONOCYTES # BLD AUTO: 0.3 X10E3/UL (ref 0.1–0.9)
MONOCYTES NFR BLD AUTO: 8 %
MORPHOLOGY BLD-IMP: NORMAL
MUCOUS THREADS URNS QL MICRO: PRESENT
NEUTROPHILS # BLD AUTO: 2.3 X10E3/UL (ref 1.4–7)
NEUTROPHILS NFR BLD AUTO: 55 %
NITRITE UR QL STRIP: NEGATIVE
NRBC BLD AUTO-RTO: NORMAL %
PH UR STRIP: 7 [PH] (ref 5–7.5)
PLATELET # BLD AUTO: 248 X10E3/UL (ref 150–450)
POTASSIUM SERPL-SCNC: 4.7 MMOL/L (ref 3.5–5.2)
PROT SERPL-MCNC: 7 G/DL (ref 6–8.5)
PROT UR QL STRIP: NEGATIVE
RBC # BLD AUTO: 4.89 X10E6/UL (ref 3.77–5.28)
RBC #/AREA URNS HPF: NORMAL /HPF (ref 0–2)
RENAL EPI CELLS #/AREA URNS HPF: NORMAL /[HPF]
SODIUM SERPL-SCNC: 141 MMOL/L (ref 134–144)
SP GR UR: 1.02 (ref 1–1.03)
T VAGINALIS URNS QL MICRO: NORMAL
TRIGL SERPL-MCNC: 68 MG/DL (ref 0–149)
TSH SERPL DL<=0.005 MIU/L-ACNC: 1.56 UIU/ML (ref 0.45–4.5)
UNIDENT CRYS URNS QL MICRO: NORMAL
URINALYSIS REFLEX  377202: ABNORMAL
URNS CMNT MICRO: NORMAL
UROBILINOGEN UR STRIP-MCNC: 0.2 MG/DL (ref 0.2–1)
VLDLC SERPL CALC-MCNC: 14 MG/DL (ref 5–40)
WBC # BLD AUTO: 4.2 X10E3/UL (ref 3.4–10.8)
WBC #/AREA URNS HPF: NORMAL /HPF (ref 0–5)
YEAST #/AREA URNS HPF: NORMAL /[HPF]

## 2020-07-13 ENCOUNTER — PATIENT MESSAGE (OUTPATIENT)
Dept: MEDICAL GROUP | Facility: MEDICAL CENTER | Age: 55
End: 2020-07-13

## 2020-07-13 RX ORDER — SERTRALINE HYDROCHLORIDE 25 MG/1
25 TABLET, FILM COATED ORAL DAILY
Qty: 30 TAB | Refills: 2 | Status: SHIPPED | OUTPATIENT
Start: 2020-07-13 | End: 2021-05-06

## 2020-07-14 NOTE — TELEPHONE ENCOUNTER
----- Message from Anette Blevins, Med Ass't sent at 7/13/2020 10:34 AM PDT -----  Regarding: FW: Procedure Question  Contact: [phone]    ----- Message -----  From: Frank Fabian  Sent: 7/13/2020  10:13 AM PDT  To: Cassie Weaver [facility]  Subject: Procedure Question                               Good morning Dr. Fontenot,  When I had the physical exam you mentioned that a bone density test was ordered as part of the exam. I didn't get any phone call related to this matter. Can you please re-send it and make sure is coded as physical exam?  Also, would you please reorder the antidepressants you gave me a while back? The hot flashes are back and I can barely sleep because of them.  Thank you,  Frank

## 2021-04-06 ENCOUNTER — IMMUNIZATION (OUTPATIENT)
Dept: FAMILY PLANNING/WOMEN'S HEALTH CLINIC | Facility: IMMUNIZATION CENTER | Age: 56
End: 2021-04-06
Payer: COMMERCIAL

## 2021-04-06 DIAGNOSIS — Z23 ENCOUNTER FOR VACCINATION: Primary | ICD-10-CM

## 2021-04-06 PROCEDURE — 0001A PFIZER SARS-COV-2 VACCINE: CPT | Performed by: INTERNAL MEDICINE

## 2021-04-06 PROCEDURE — 91300 PFIZER SARS-COV-2 VACCINE: CPT | Performed by: INTERNAL MEDICINE

## 2021-04-29 ENCOUNTER — IMMUNIZATION (OUTPATIENT)
Dept: FAMILY PLANNING/WOMEN'S HEALTH CLINIC | Facility: IMMUNIZATION CENTER | Age: 56
End: 2021-04-29
Payer: COMMERCIAL

## 2021-04-29 DIAGNOSIS — Z23 ENCOUNTER FOR VACCINATION: Primary | ICD-10-CM

## 2021-04-29 PROCEDURE — 0002A PFIZER SARS-COV-2 VACCINE: CPT

## 2021-04-29 PROCEDURE — 91300 PFIZER SARS-COV-2 VACCINE: CPT

## 2021-05-06 ENCOUNTER — OFFICE VISIT (OUTPATIENT)
Dept: MEDICAL GROUP | Facility: MEDICAL CENTER | Age: 56
End: 2021-05-06
Payer: COMMERCIAL

## 2021-05-06 VITALS
TEMPERATURE: 98.3 F | HEIGHT: 67 IN | SYSTOLIC BLOOD PRESSURE: 118 MMHG | WEIGHT: 116 LBS | DIASTOLIC BLOOD PRESSURE: 62 MMHG | HEART RATE: 61 BPM | OXYGEN SATURATION: 98 % | BODY MASS INDEX: 18.21 KG/M2

## 2021-05-06 DIAGNOSIS — Z86.59 HISTORY OF DEPRESSION: ICD-10-CM

## 2021-05-06 DIAGNOSIS — M81.0 POSTMENOPAUSAL BONE LOSS: ICD-10-CM

## 2021-05-06 DIAGNOSIS — Z01.818 PREOP TESTING: ICD-10-CM

## 2021-05-06 DIAGNOSIS — Z00.00 PREVENTATIVE HEALTH CARE: Chronic | ICD-10-CM

## 2021-05-06 DIAGNOSIS — E78.5 DYSLIPIDEMIA: ICD-10-CM

## 2021-05-06 DIAGNOSIS — E55.9 VITAMIN D DEFICIENCY: ICD-10-CM

## 2021-05-06 DIAGNOSIS — N95.1 PERIMENOPAUSAL: ICD-10-CM

## 2021-05-06 PROCEDURE — 99396 PREV VISIT EST AGE 40-64: CPT | Performed by: INTERNAL MEDICINE

## 2021-05-06 ASSESSMENT — PATIENT HEALTH QUESTIONNAIRE - PHQ9: CLINICAL INTERPRETATION OF PHQ2 SCORE: 0

## 2021-05-06 ASSESSMENT — FIBROSIS 4 INDEX: FIB4 SCORE: 0.96

## 2021-05-06 NOTE — PROGRESS NOTES
Subjective:      Frank Fabian is a 55 y.o. female who presents with annual exam      HPI       Here for annual exam  Medications, allergies, medical history, surgical history, social history, family history  reviewed and updated  Sees  gyn   SH , two children one in 6th grade and one daughter in college in [place], [place]. Does exercise runs outdoors 2.5 km daily and stationary bike daily, still trying to eat healthy following a good diet, avoiding processed foods  Does stretching at home   FH sister healthy  patient will be traveling to [facility] in a few weeks, she will be having a cosmetic eye procedure and needs preoperative labs  Has had left shoulder symptoms impingement syndrome, history of right shoulder pain in the past has seen orthopedics, previous MRIs of the left shoulder has showed moderate arthritis.  Patient has been doing stretching exercises on her own without improvement.  Right-handed female, no trauma or overuse.    Current Outpatient Medications   Medication Sig Dispense Refill   • sertraline (ZOLOFT) 25 MG tablet TAKE 1 TABLET BY MOUTH EVERY DAY 90 Tab 2   • sertraline (ZOLOFT) 25 MG tablet Take 1 Tab by mouth every day. 30 Tab 2     No current facility-administered medications for this visit.     Dyslipidemia  10/10 chol 239,trig 71,hdl 81,ldl 144  7/12 chol 226,trig 53,hdl 73,ldl 142  10/16/12 chol 249,trig 74,hdl 91,ldl 143  9/17/13 chol 225,trig 66,hdl 76,ldl 136  10/11/13 chol 245,trig 74,hdl 82,ldl 148  11/22/14 chol 216,trig 60,hdl 81,ldl 123    11/25/15 chol 204,trig 54,hdl 76,ldl 117  7/30/16 chol 254,trig 66,hdl 92,ldl 149  2/13/17 chl 239,trig 53,hdl 96,ldl 132  5/11/18 chol 263,trig 96,hdl 96,ldl 152  7/24/19 chol 269,trig 64,hdl 99,ldl 157; 10 year risk 0.7%  6/28/20 chol 278,trig 68,hdl 98,ldl 166     history abd pain  5/17/17 gyn ultrasound at [facility] per gyn  small mildly complex 1.9 cm left ovarian cyst  12/15/17 chronic right lower quadrant,  right upper quadrant pain, labs ordered, CT abdomen and pelvis ordered, old records, gyn ultrasound pelvic from [facility] done in may, referral [facility]  12/22/217 cbc,cmp,lipase negative  1/9/18 CT abdomen and pelvis with done at [facility] imaging, prominent stool cecum and ascending colon suggesting constipation  1/19/18 colon per [facility] 4 mm polyp sigmoid colon, pathology post inflammatory polyp, follow-up 5 years based upon results     History depression  Had been on prozac many yrs ago  12/2/10 start zoloft 50 mg  1/11 off zoloft  3/14/13 start zoloft    11/18/14 off zoloft  8/4/19 now agrees to SSRI trial via [employer], will start zoloft 25 mg for perimenopausal symptoms, sent to pharmacy advised to schedule appointment for follow-up  7/13/20 start zoloft 25 mg     history shoulder pain  5/16/13 x-ray of shoulder minimal DTT acromioclavicular joint  5/16/13 MRI left shoulder moderate acromioclavicular edema suggesting grade 1 sprain or overuse, intact rotator cuff and labrum  9/23/13 saw [facility] no report yet, surgery recommended, would like second opinion, referral to  pending  2/26/14  ortho note; left a.c. joint injection for bursitis, repeat a.c. joint or possible subacromial or subcoracoid injection in the future if symptoms persist  9/22/16  [facility] note anterior right shoulder pain unclear etiology, referred for MRI  10/5/16 MRI right shoulder tendinosis supraspinatus, mild DJD right before meals with significant marrow edema and soft tissue edema, likely external impingement supraspinatus  12/20/16  orthopedic note persistent right shoulder pain despite steroid injection, right shoulder rotator cuff syndrome and before meals injury, possible subcoracoid impingement, offered repeat injection versus arthroscopy, patient will consider     History UTI  3/14/13 UTI e.coli sensitive to all antibiotics  6/24/14 UTI e.coli    9/29/15 UTI symptoms self treated with cipro    8/25/17 UTI  proteus sensitive to all antbiotics except nitrofurantoin, started on bactrim  9/1/17 use cipro prn UTI symptoms  5/11/18 UA negative  7/24/19 UA negative  6/29/20 UA negative      Low back pain  11/09 MRI LS L4-5 disc reveals minimal disc bulging   12/21/16 MRI lumbar spine slight interval progression of lumbar spondylosis compared to 2009 per  [facility]   2/6/17 has tried anti-inflammatories, prednisone, narcotics, physical therapy, acupuncture no benefit     Perimenopausal  7/15/19 labs ordered, hot flashes, declines HRT or SSRI, monitor symptoms likely postmenopausal  7/24/19 estradiol 7.7,LH 40,FSH 93  8/4/19 now agrees to SSRI trial via [employer], will start zoloft 25 mg  5/11/20 did not start zoloft  7/13/20 start zoloft 25 mg     Preventative health  6/17/16 td in [place]  1/19/18 colon per [facility] 4 mm polyp sigmoid colon, pathology post inflammatory polyp, follow-up 5 years based upon results  11/27/18  eye exam  7/15/19 declines vaccines  7/24/19 estradiol 7.7,LH 40,FSH 93  5/6/20 pap negative per   5/11/20 declines mammogram  6/28/20 vit d 32  6/28/20 A1c 5.3%  4/6/21 covid [employer] second     Status post appendectomy    Patient Active Problem List   Diagnosis   • S/P appendectomy   • Preventative health care   • Low back pain   • Dyslipidemia   • History of depression   • History of shoulder pain   • History of UTI   • History of abdominal pain   • Ovarian cyst   • Perimenopausal     Depression Screening    Little interest or pleasure in doing things?  0 - not at all  Feeling down, depressed , or hopeless? 0 - not at all  Patient Health Questionnaire Score: 0            Health Maintenance Summary                IMM ZOSTER VACCINES Overdue 6/16/2017     MAMMOGRAM Postponed 5/11/2021 Originally 11/19/2016. Patient Refused     Patient Declined 11/19/2015      Patient has more history with this topic...    IMM INFLUENZA Next Due 9/1/2021     COLONOSCOPY Next Due 1/19/2023       Done 1/19/2018 REFERRAL TO GI FOR COLONOSCOPY    PAP SMEAR Next Due 5/4/2023      Done 5/4/2020 PATHOLOGY GYNECOLOGY SPECIMEN     Patient has more history with this topic...    IMM DTaP/Tdap/Td Vaccine Next Due 6/17/2026      Done 6/17/2016 Imm Admin: Tdap Vaccine     Patient has more history with this topic...          Patient Care Team:  Chago Fontenot M.D. as PCP - General (Internal Medicine)      ROS       Objective:      Physical Exam  Vitals and nursing note reviewed.   Constitutional:       Appearance: Normal appearance.   HENT:      Head: Normocephalic and atraumatic.      Right Ear: External ear normal.      Left Ear: External ear normal.   Eyes:      Conjunctiva/sclera: Conjunctivae normal.   Cardiovascular:      Rate and Rhythm: Normal rate and regular rhythm.      Heart sounds: Normal heart sounds.   Pulmonary:      Effort: Pulmonary effort is normal.      Breath sounds: Normal breath sounds.   Abdominal:      General: There is no distension.   Musculoskeletal:         General: No swelling.      Cervical back: Neck supple.   Skin:     General: Skin is warm.   Neurological:      General: No focal deficit present.      Mental Status: She is alert.   Psychiatric:         Mood and Affect: Mood normal.         Behavior: Behavior normal.                 Assessment/Plan:        Assessment  #! Annual exam    #2 dyslipidemia diet controlled 6/28/20 chol 278,trig 68,hdl 98,ldl 166    #3 perimenopausal off Zoloft, no benefit    #4 preventative health  6/17/16 td in [place]  1/19/18 colon per [facility] 4 mm polyp sigmoid colon, pathology post inflammatory polyp, follow-up 5 years based upon results  11/27/18  eye exam  7/24/19 estradiol 7.7,LH 40,FSH 93  5/6/20 pap negative per   6/28/20 vit d 32  6/28/20 A1c 5.3%  4/6/21 covid [employer] second  5/6/21 declines vaccines  5/6/21 declines mammogram #3 perimenopause also if no benefit    #5 upcoming cosmetic surgery for cholesterol deposits in [facility],  needs preoperative labs done    #6 postmenopausal bone loss    Plan  #1 declines mammogram    #2 dexa ordered for postmenopausal bone loss    #3 declines shingrix vaccination, she has completed her Covid series    #4 labs    #5 ekg with history of dyslipidemia, sinus no acute changes    #6  Preoperative labs for upcoming trip    #7 consider CT calcium score depending upon dyslipidemia, she will consider    #8 continue good nutrition, diet, exercise    #9 follow-up 1 year

## 2021-05-11 ENCOUNTER — TELEPHONE (OUTPATIENT)
Dept: MEDICAL GROUP | Facility: MEDICAL CENTER | Age: 56
End: 2021-05-11

## 2021-05-11 LAB
25(OH)D3+25(OH)D2 SERPL-MCNC: 25.4 NG/ML (ref 30–100)
ALBUMIN SERPL-MCNC: 4.7 G/DL (ref 3.8–4.9)
ALBUMIN/GLOB SERPL: 1.9 {RATIO} (ref 1.2–2.2)
ALP SERPL-CCNC: 95 IU/L (ref 39–117)
ALT SERPL-CCNC: 15 IU/L (ref 0–32)
APPEARANCE UR: CLEAR
APTT PPP: 28 SEC (ref 24–33)
AST SERPL-CCNC: 18 IU/L (ref 0–40)
BACTERIA #/AREA URNS HPF: NORMAL /[HPF]
BASOPHILS # BLD AUTO: 0 X10E3/UL (ref 0–0.2)
BASOPHILS NFR BLD AUTO: 1 %
BILIRUB SERPL-MCNC: 1.1 MG/DL (ref 0–1.2)
BILIRUB UR QL STRIP: NEGATIVE
BUN SERPL-MCNC: 15 MG/DL (ref 6–24)
BUN/CREAT SERPL: 19 (ref 9–23)
CALCIUM SERPL-MCNC: 9.9 MG/DL (ref 8.7–10.2)
CASTS URNS MICRO: NORMAL
CASTS URNS QL MICRO: NORMAL /LPF
CHLORIDE SERPL-SCNC: 102 MMOL/L (ref 96–106)
CHOLEST SERPL-MCNC: 280 MG/DL (ref 100–199)
CO2 SERPL-SCNC: 23 MMOL/L (ref 20–29)
COLOR UR: YELLOW
CREAT SERPL-MCNC: 0.8 MG/DL (ref 0.57–1)
CRYSTALS URNS MICRO: NORMAL
EOSINOPHIL # BLD AUTO: 0.2 X10E3/UL (ref 0–0.4)
EOSINOPHIL NFR BLD AUTO: 5 %
EPI CELLS #/AREA URNS HPF: NORMAL /HPF (ref 0–10)
ERYTHROCYTE [DISTWIDTH] IN BLOOD BY AUTOMATED COUNT: 11.8 % (ref 11.7–15.4)
GLOBULIN SER CALC-MCNC: 2.5 G/DL (ref 1.5–4.5)
GLUCOSE SERPL-MCNC: 82 MG/DL (ref 65–99)
GLUCOSE UR QL: NEGATIVE
HBA1C MFR BLD: 5.5 % (ref 4.8–5.6)
HCT VFR BLD AUTO: 46.4 % (ref 34–46.6)
HDLC SERPL-MCNC: 100 MG/DL
HGB BLD-MCNC: 15.1 G/DL (ref 11.1–15.9)
HGB UR QL STRIP: NEGATIVE
IMM GRANULOCYTES # BLD AUTO: 0 X10E3/UL (ref 0–0.1)
IMM GRANULOCYTES NFR BLD AUTO: 0 %
IMMATURE CELLS  115398: NORMAL
INR PPP: 1 (ref 0.9–1.2)
KETONES UR QL STRIP: NEGATIVE
LABORATORY COMMENT REPORT: ABNORMAL
LDLC SERPL CALC-MCNC: 168 MG/DL (ref 0–99)
LEUKOCYTE ESTERASE UR QL STRIP: NEGATIVE
LYMPHOCYTES # BLD AUTO: 1.3 X10E3/UL (ref 0.7–3.1)
LYMPHOCYTES NFR BLD AUTO: 33 %
MCH RBC QN AUTO: 30.6 PG (ref 26.6–33)
MCHC RBC AUTO-ENTMCNC: 32.5 G/DL (ref 31.5–35.7)
MCV RBC AUTO: 94 FL (ref 79–97)
MICRO URNS: NORMAL
MICRO URNS: NORMAL
MONOCYTES # BLD AUTO: 0.3 X10E3/UL (ref 0.1–0.9)
MONOCYTES NFR BLD AUTO: 8 %
MORPHOLOGY BLD-IMP: NORMAL
MUCOUS THREADS URNS QL MICRO: NORMAL
NEUTROPHILS # BLD AUTO: 2.1 X10E3/UL (ref 1.4–7)
NEUTROPHILS NFR BLD AUTO: 53 %
NITRITE UR QL STRIP: NEGATIVE
NRBC BLD AUTO-RTO: NORMAL %
PH UR STRIP: 5.5 [PH] (ref 5–7.5)
PLATELET # BLD AUTO: 318 X10E3/UL (ref 150–450)
POTASSIUM SERPL-SCNC: 4.4 MMOL/L (ref 3.5–5.2)
PROT SERPL-MCNC: 7.2 G/DL (ref 6–8.5)
PROT UR QL STRIP: NEGATIVE
PROTHROMBIN TIME: 11 SEC (ref 9.1–12)
RBC # BLD AUTO: 4.94 X10E6/UL (ref 3.77–5.28)
RBC #/AREA URNS HPF: NORMAL /HPF (ref 0–2)
RENAL EPI CELLS #/AREA URNS HPF: NORMAL /[HPF]
SODIUM SERPL-SCNC: 141 MMOL/L (ref 134–144)
SP GR UR: 1.02 (ref 1–1.03)
T VAGINALIS URNS QL MICRO: NORMAL
TRIGL SERPL-MCNC: 76 MG/DL (ref 0–149)
TSH SERPL DL<=0.005 MIU/L-ACNC: 2.4 UIU/ML (ref 0.45–4.5)
UNIDENT CRYS URNS QL MICRO: NORMAL
URINALYSIS REFLEX  377202: NORMAL
URNS CMNT MICRO: NORMAL
UROBILINOGEN UR STRIP-MCNC: 0.2 MG/DL (ref 0.2–1)
VLDLC SERPL CALC-MCNC: 12 MG/DL (ref 5–40)
WBC # BLD AUTO: 4 X10E3/UL (ref 3.4–10.8)
WBC #/AREA URNS HPF: NORMAL /HPF (ref 0–5)
YEAST #/AREA URNS HPF: NORMAL /[HPF]

## 2021-05-12 NOTE — TELEPHONE ENCOUNTER
Notified with results, HDL elevated, LDL increased no need for statin therapy 10-year risk 1%, vitamin D is low, will start over-the-counter 4000 units daily, results printed to mail to the patient so she can take with her to [facility].

## 2022-03-21 ENCOUNTER — OFFICE VISIT (OUTPATIENT)
Dept: MEDICAL GROUP | Facility: MEDICAL CENTER | Age: 57
End: 2022-03-21
Payer: COMMERCIAL

## 2022-03-21 VITALS
OXYGEN SATURATION: 98 % | HEART RATE: 57 BPM | SYSTOLIC BLOOD PRESSURE: 108 MMHG | BODY MASS INDEX: 18.68 KG/M2 | TEMPERATURE: 97.8 F | HEIGHT: 67 IN | DIASTOLIC BLOOD PRESSURE: 66 MMHG | WEIGHT: 119 LBS

## 2022-03-21 DIAGNOSIS — Z00.00 PREVENTATIVE HEALTH CARE: ICD-10-CM

## 2022-03-21 DIAGNOSIS — Z86.59 HISTORY OF DEPRESSION: ICD-10-CM

## 2022-03-21 DIAGNOSIS — Z12.4 CERVICAL CANCER SCREENING: ICD-10-CM

## 2022-03-21 PROCEDURE — 99396 PREV VISIT EST AGE 40-64: CPT | Performed by: INTERNAL MEDICINE

## 2022-03-21 ASSESSMENT — PATIENT HEALTH QUESTIONNAIRE - PHQ9: CLINICAL INTERPRETATION OF PHQ2 SCORE: 0

## 2022-03-21 ASSESSMENT — FIBROSIS 4 INDEX: FIB4 SCORE: 0.79

## 2022-03-21 NOTE — PROGRESS NOTES
Subjective     Frank Fabian is a 56 y.o. female who presents with Annual Exam            HPI     Here for annual exam  Medications, allergies, medical history, surgical history, social history, family history  reviewed and updated  SH , exercises for 30 to 45 minutes, no soda, one coffee in the am, water daily, no tobacco, etoh rare, working [employer], two children healthy one daughter completed college and son in 7th grade grades.  Has had work stress since she has had to go back to work 50% in office but still working 50% at home.  Tries to follow a healthy diet.  Sees  gyn, postmenopausal, does have mood changes at times, cold intolerance, no change in sleep, good social support with her family, she has tried sertraline in the past for perimenopausal symptoms but has been off those medications for over a year  Left wrist discomfort at times with certain rotating movements, does not seem to bother her all the time, does not appear to be workstation related, no history of trauma, no history of overuse, does not bother her with workouts.      No current outpatient medications on file.     No current facility-administered medications for this visit.       Dyslipidemia  10/10 chol 239,trig 71,hdl 81,ldl 144  7/12 chol 226,trig 53,hdl 73,ldl 142  10/16/12 chol 249,trig 74,hdl 91,ldl 143  9/17/13 chol 225,trig 66,hdl 76,ldl 136  10/11/13 chol 245,trig 74,hdl 82,ldl 148  11/22/14 chol 216,trig 60,hdl 81,ldl 123    11/25/15 chol 204,trig 54,hdl 76,ldl 117  7/30/16 chol 254,trig 66,hdl 92,ldl 149  2/13/17 chl 239,trig 53,hdl 96,ldl 132  5/11/18 chol 263,trig 96,hdl 96,ldl 152  7/24/19 chol 269,trig 64,hdl 99,ldl 157; 10 year risk 0.7%  6/28/20 chol 278,trig 68,hdl 98,ldl 166  5/11/21 chol 280,trig 76,hdl 100,ldl 168; 10 year risk 1.1%     history abd pain  5/17/17 gyn ultrasound at [facility] per gyn  small mildly complex 1.9 cm left ovarian cyst  12/15/17 chronic right lower quadrant, right upper  quadrant pain, labs ordered, CT abdomen and pelvis ordered, old records, gyn ultrasound pelvic from [facility] done in may, referral [facility]  12/22/217 cbc,cmp,lipase negative  1/9/18 CT abdomen and pelvis with done at [facility] imaging, prominent stool cecum and ascending colon suggesting constipation  1/19/18 colon per [facility] 4 mm polyp sigmoid colon, pathology post inflammatory polyp, follow-up 5 years based upon results     History depression  Had been on prozac many yrs ago  12/2/10 start zoloft 50 mg  1/11 off zoloft  3/14/13 start zoloft    11/18/14 off zoloft  8/4/19 now agrees to SSRI trial via [employer], will start zoloft 25 mg for perimenopausal symptoms, sent to pharmacy advised to schedule appointment for follow-up  7/13/20 start zoloft 25 mg  5/6/21 off zoloft     history shoulder pain  5/16/13 x-ray of shoulder minimal DTT acromioclavicular joint  5/16/13 MRI left shoulder moderate acromioclavicular edema suggesting grade 1 sprain or overuse, intact rotator cuff and labrum  9/23/13 saw [facility] no report yet, surgery recommended, would like second opinion, referral to  pending  2/26/14  ortho note; left a.c. joint injection for bursitis, repeat a.c. joint or possible subacromial or subcoracoid injection in the future if symptoms persist  9/22/16  [facility] note anterior right shoulder pain unclear etiology, referred for MRI  10/5/16 MRI right shoulder tendinosis supraspinatus, mild DJD right before meals with significant marrow edema and soft tissue edema, likely external impingement supraspinatus  12/20/16  orthopedic note persistent right shoulder pain despite steroid injection, right shoulder rotator cuff syndrome and before meals injury, possible subcoracoid impingement, offered repeat injection versus arthroscopy, patient will consider     History UTI  3/14/13 UTI e.coli sensitive to all antibiotics  6/24/14 UTI e.coli    9/29/15 UTI symptoms self treated with cipro    8/25/17  UTI proteus sensitive to all antbiotics except nitrofurantoin, started on bactrim  9/1/17 use cipro prn UTI symptoms  5/11/18 UA negative  7/24/19 UA negative  6/29/20 UA negative  5/11/21 UA negative      Low back pain  11/09 MRI LS L4-5 disc reveals minimal disc bulging   12/21/16 MRI lumbar spine slight interval progression of lumbar spondylosis compared to 2009 per  [facility]   2/6/17 has tried anti-inflammatories, prednisone, narcotics, physical therapy, acupuncture no benefit     Perimenopausal  7/15/19 labs ordered, hot flashes, declines HRT or SSRI, monitor symptoms likely postmenopausal  7/24/19 estradiol 7.7,LH 40,FSH 93  8/4/19 now agrees to SSRI trial via [employer], will start zoloft 25 mg  5/11/20 did not start zoloft  7/13/20 start zoloft 25 mg  5/6/21 off zoloft      Preventative health  6/17/16 td in [place]  1/19/18 colon per [facility] 4 mm polyp sigmoid colon, pathology post inflammatory polyp, follow-up 5 years based upon results  11/27/18  eye exam  7/24/19 estradiol 7.7,LH 40,FSH 93  5/6/20 pap negative per   4/6/21 covid pfizer second  5/6/21 declines vaccines  5/6/21 declines mammogram  5/11/21 vit d 25 start 4000 units   5/11/21 A1c 5.5%  9/29/21 covid moderna booster     Status post appendectomy    Patient Active Problem List   Diagnosis   • S/P appendectomy   • Preventative health care   • Low back pain   • Dyslipidemia   • History of depression   • History of shoulder pain   • History of UTI   • History of abdominal pain   • Ovarian cyst   • Perimenopausal     Depression Screening  Little interest or pleasure in doing things?  0 - not at all  Feeling down, depressed , or hopeless? 0 - not at all  Patient Health Questionnaire Score: 0        Patient Care Team:  Chago Fontenot M.D. as PCP - General (Internal Medicine)      ROS           Objective     /66 (BP Location: Right arm, Patient Position: Sitting, BP Cuff Size: Adult)   Pulse (!) 57   Temp 36.6 °C  "(97.8 °F)   Ht 1.702 m (5' 7\")   Wt 54 kg (119 lb)   LMP 01/01/2020 (Approximate)   SpO2 98%   BMI 18.64 kg/m²      Physical Exam  Vitals and nursing note reviewed.   Constitutional:       Appearance: Normal appearance.   HENT:      Head: Normocephalic and atraumatic.      Right Ear: External ear normal.      Left Ear: External ear normal.   Eyes:      Conjunctiva/sclera: Conjunctivae normal.   Cardiovascular:      Rate and Rhythm: Normal rate and regular rhythm.      Heart sounds: Normal heart sounds.   Pulmonary:      Effort: Pulmonary effort is normal.      Breath sounds: Normal breath sounds.   Abdominal:      General: There is no distension.   Musculoskeletal:         General: No swelling.   Skin:     General: Skin is warm.   Neurological:      General: No focal deficit present.      Mental Status: She is alert.   Psychiatric:         Mood and Affect: Mood normal.         Behavior: Behavior normal.       Left wrist mild tenderness over the ulnar aspect, normal range of motion, no tenderness to palpation over the lunate or ulnocarpal joint region, perhaps some mild tenderness over the ulnar collateral ligament but no edema         Assessment & Plan        Assessment  #1 annual examination    #2  Dyslipidemia diet controlled    #3  Postmenopausal symptoms no changes at times, cold intolerance, followed by gynecology, no HRT, has tried Prozac and Zoloft in the past    #4 preventative health up-to-date on colonoscopy duenext january, declines mammogram, she has not had bone density testing for postmenopausal bone loss, she has had her first 2 COVID vaccines, declines other vaccines    #5 left wrist strain and tendinitis, possibly arthritis as well depending on her activity level and gripping position or repetitive use, no trauma, no history of fracture    #6 vitamin D deficiency    Plan  #1 declines mammogram    #2 she does agree to the bone density test but she will need to check with her insurance to see " where she can have that done and be covered, either [facility], [email or web address] or [facility] let me know where this is covered and I can submit the referral request     #3 recommend she get the COVID booster as she declines other vaccines    #4 continue her good nutrition, diet, exercise program, including stretching    #5 we could resume an SSRI for some of her postmenopausal symptoms, she will consider    #6 she can use topical Voltaren for the left wrist area, if it worsens to let me know then we could consider imaging that area, but for now no imaging is necessary     #7 follow-up gynecology    #8 labs

## 2022-04-06 ENCOUNTER — TELEPHONE (OUTPATIENT)
Dept: MEDICAL GROUP | Facility: MEDICAL CENTER | Age: 57
End: 2022-04-06
Payer: COMMERCIAL

## 2022-04-06 PROBLEM — M85.80 OSTEOPENIA: Status: ACTIVE | Noted: 2022-04-06

## 2022-04-07 NOTE — TELEPHONE ENCOUNTER
Notified with bone density result, done 3/30/22 at [facility], osteopenia lumbar spine and hip, FRAX score not elevated thus no need for bisphosphonate therapy.  Recommend increasing weightbearing exercise from 1 day to 3 days/week, take calcium 1000 to 1200 mg total per day divided dosages, she will have her lab test done and we can evaluate where her vitamin D level is at.  We also will repeat her bone density test in 2 years.  She understands and agrees.

## 2022-04-13 ENCOUNTER — TELEPHONE (OUTPATIENT)
Dept: MEDICAL GROUP | Facility: MEDICAL CENTER | Age: 57
End: 2022-04-13
Payer: COMMERCIAL

## 2022-04-13 LAB
25(OH)D3+25(OH)D2 SERPL-MCNC: 21.4 NG/ML (ref 30–100)
ALBUMIN SERPL-MCNC: 4.8 G/DL (ref 3.8–4.9)
ALBUMIN/GLOB SERPL: 1.8 {RATIO} (ref 1.2–2.2)
ALP SERPL-CCNC: 88 IU/L (ref 44–121)
ALT SERPL-CCNC: 13 IU/L (ref 0–32)
AST SERPL-CCNC: 19 IU/L (ref 0–40)
BASOPHILS # BLD AUTO: 0.1 X10E3/UL (ref 0–0.2)
BASOPHILS NFR BLD AUTO: 1 %
BILIRUB SERPL-MCNC: 1.4 MG/DL (ref 0–1.2)
BUN SERPL-MCNC: 15 MG/DL (ref 6–24)
BUN/CREAT SERPL: 20 (ref 9–23)
CALCIUM SERPL-MCNC: 9.7 MG/DL (ref 8.7–10.2)
CHLORIDE SERPL-SCNC: 103 MMOL/L (ref 96–106)
CHOLEST SERPL-MCNC: 262 MG/DL (ref 100–199)
CO2 SERPL-SCNC: 23 MMOL/L (ref 20–29)
CREAT SERPL-MCNC: 0.75 MG/DL (ref 0.57–1)
EGFRCR SERPLBLD CKD-EPI 2021: 95 ML/MIN/1.73
EOSINOPHIL # BLD AUTO: 0.2 X10E3/UL (ref 0–0.4)
EOSINOPHIL NFR BLD AUTO: 4 %
ERYTHROCYTE [DISTWIDTH] IN BLOOD BY AUTOMATED COUNT: 11.9 % (ref 11.7–15.4)
GLOBULIN SER CALC-MCNC: 2.6 G/DL (ref 1.5–4.5)
GLUCOSE SERPL-MCNC: 89 MG/DL (ref 65–99)
HBA1C MFR BLD: 5.4 % (ref 4.8–5.6)
HCT VFR BLD AUTO: 47.3 % (ref 34–46.6)
HDLC SERPL-MCNC: 97 MG/DL
HGB BLD-MCNC: 15.3 G/DL (ref 11.1–15.9)
IMM GRANULOCYTES # BLD AUTO: 0 X10E3/UL (ref 0–0.1)
IMM GRANULOCYTES NFR BLD AUTO: 0 %
IMMATURE CELLS  115398: ABNORMAL
LABORATORY COMMENT REPORT: ABNORMAL
LDLC SERPL CALC-MCNC: 153 MG/DL (ref 0–99)
LYMPHOCYTES # BLD AUTO: 1.2 X10E3/UL (ref 0.7–3.1)
LYMPHOCYTES NFR BLD AUTO: 33 %
MCH RBC QN AUTO: 29.7 PG (ref 26.6–33)
MCHC RBC AUTO-ENTMCNC: 32.3 G/DL (ref 31.5–35.7)
MCV RBC AUTO: 92 FL (ref 79–97)
MONOCYTES # BLD AUTO: 0.3 X10E3/UL (ref 0.1–0.9)
MONOCYTES NFR BLD AUTO: 7 %
MORPHOLOGY BLD-IMP: ABNORMAL
NEUTROPHILS # BLD AUTO: 2 X10E3/UL (ref 1.4–7)
NEUTROPHILS NFR BLD AUTO: 55 %
NRBC BLD AUTO-RTO: ABNORMAL %
PLATELET # BLD AUTO: 234 X10E3/UL (ref 150–450)
POTASSIUM SERPL-SCNC: 4.5 MMOL/L (ref 3.5–5.2)
PROT SERPL-MCNC: 7.4 G/DL (ref 6–8.5)
RBC # BLD AUTO: 5.15 X10E6/UL (ref 3.77–5.28)
SODIUM SERPL-SCNC: 142 MMOL/L (ref 134–144)
TRIGL SERPL-MCNC: 74 MG/DL (ref 0–149)
TSH SERPL DL<=0.005 MIU/L-ACNC: 2.15 UIU/ML (ref 0.45–4.5)
VLDLC SERPL CALC-MCNC: 12 MG/DL (ref 5–40)
WBC # BLD AUTO: 3.7 X10E3/UL (ref 3.4–10.8)

## 2022-04-14 NOTE — TELEPHONE ENCOUNTER
Notified with labs, recommend start vitamin D total 4000 units/day.  She will also take a calcium supplement total 1000 mg/day.  Remainder of her labs are fine, total cholesterol and LDL are high but her HDL is excellent, no need for statin therapy.  Continue to work on her good nutrition and exercise.

## 2022-11-22 ENCOUNTER — HOSPITAL ENCOUNTER (OUTPATIENT)
Dept: LAB | Facility: MEDICAL CENTER | Age: 57
End: 2022-11-22
Attending: PHYSICIAN ASSISTANT

## 2022-11-22 ENCOUNTER — HOSPITAL ENCOUNTER (OUTPATIENT)
Facility: MEDICAL CENTER | Age: 57
End: 2022-11-22
Attending: PHYSICIAN ASSISTANT
Payer: COMMERCIAL

## 2022-11-22 PROCEDURE — 88175 CYTOPATH C/V AUTO FLUID REDO: CPT

## 2022-11-22 PROCEDURE — 87624 HPV HI-RISK TYP POOLED RSLT: CPT

## 2022-11-23 LAB
CYTOLOGY REG CYTOL: NORMAL
HPV HR 12 DNA CVX QL NAA+PROBE: NEGATIVE
HPV16 DNA SPEC QL NAA+PROBE: NEGATIVE
HPV18 DNA SPEC QL NAA+PROBE: NEGATIVE
SPECIMEN SOURCE: NORMAL

## 2023-03-06 ENCOUNTER — OFFICE VISIT (OUTPATIENT)
Dept: MEDICAL GROUP | Facility: MEDICAL CENTER | Age: 58
End: 2023-03-06
Payer: COMMERCIAL

## 2023-03-06 ENCOUNTER — TELEPHONE (OUTPATIENT)
Dept: MEDICAL GROUP | Facility: MEDICAL CENTER | Age: 58
End: 2023-03-06

## 2023-03-06 VITALS
HEIGHT: 68 IN | TEMPERATURE: 97.2 F | SYSTOLIC BLOOD PRESSURE: 104 MMHG | HEART RATE: 62 BPM | DIASTOLIC BLOOD PRESSURE: 60 MMHG | BODY MASS INDEX: 18.04 KG/M2 | WEIGHT: 119 LBS

## 2023-03-06 DIAGNOSIS — Z11.59 NEED FOR HEPATITIS C SCREENING TEST: ICD-10-CM

## 2023-03-06 DIAGNOSIS — E55.9 VITAMIN D DEFICIENCY: ICD-10-CM

## 2023-03-06 DIAGNOSIS — Z87.440 HISTORY OF UTI: ICD-10-CM

## 2023-03-06 DIAGNOSIS — R30.0 DYSURIA: ICD-10-CM

## 2023-03-06 DIAGNOSIS — Z11.59 ENCOUNTER FOR HEPATITIS C SCREENING TEST FOR LOW RISK PATIENT: ICD-10-CM

## 2023-03-06 DIAGNOSIS — Z86.16 HISTORY OF COVID-19: ICD-10-CM

## 2023-03-06 DIAGNOSIS — Z00.00 PREVENTATIVE HEALTH CARE: Primary | ICD-10-CM

## 2023-03-06 LAB
APPEARANCE UR: CLEAR
BILIRUB UR STRIP-MCNC: NORMAL MG/DL
COLOR UR AUTO: YELLOW
GLUCOSE UR STRIP.AUTO-MCNC: NORMAL MG/DL
KETONES UR STRIP.AUTO-MCNC: NORMAL MG/DL
LEUKOCYTE ESTERASE UR QL STRIP.AUTO: NORMAL
NITRITE UR QL STRIP.AUTO: NORMAL
PH UR STRIP.AUTO: 6 [PH] (ref 5–8)
PROT UR QL STRIP: NORMAL MG/DL
RBC UR QL AUTO: NORMAL
SP GR UR STRIP.AUTO: 1.02
UROBILINOGEN UR STRIP-MCNC: 0.2 MG/DL

## 2023-03-06 PROCEDURE — 99396 PREV VISIT EST AGE 40-64: CPT | Performed by: INTERNAL MEDICINE

## 2023-03-06 PROCEDURE — 81002 URINALYSIS NONAUTO W/O SCOPE: CPT | Performed by: INTERNAL MEDICINE

## 2023-03-06 RX ORDER — SULFAMETHOXAZOLE AND TRIMETHOPRIM 800; 160 MG/1; MG/1
1 TABLET ORAL 2 TIMES DAILY
Qty: 6 TABLET | Refills: 0 | Status: SHIPPED | OUTPATIENT
Start: 2023-03-06 | End: 2024-02-23

## 2023-03-06 ASSESSMENT — PATIENT HEALTH QUESTIONNAIRE - PHQ9: CLINICAL INTERPRETATION OF PHQ2 SCORE: 0

## 2023-03-06 ASSESSMENT — FIBROSIS 4 INDEX: FIB4 SCORE: 1.24

## 2023-03-06 NOTE — PROGRESS NOTES
Subjective     Frank Fabian is a 57 y.o. female who presents with Annual Exam (/)            HPI     Here for annual exam   Medications, allergies, medical history, surgical history, social history, family history  reviewed and updated  Does cardio at home once per week and does weight training the other days, does yoga once weekly at [facility]. Working at home 5 days per week 10 hours day. Does workout in the am. Tries to eat healthy diet, not much sweets, avoids fried foods, water intake 1 liter per day, drinks tea herbal, coffee in am. Sleep 8 hours. No etoh   Eye exam annually   Teeth cleaning twice per year  No hearing changes  Sees  gyn 11/22/22 pap per gyn   Patient had a COVID infection around New Year's Sharita she got that from her son, initially had sore throat and fatigue, no change in taste or smell, no cough or sob, symptoms have resolved       Current Outpatient Medications   Medication Sig Dispense Refill    sulfamethoxazole-trimethoprim (BACTRIM DS) 800-160 MG tablet Take 1 Tablet by mouth 2 times a day. 6 Tablet 0     No current facility-administered medications for this visit.       Dyslipidemia  10/10 chol 239,trig 71,hdl 81,ldl 144  7/12 chol 226,trig 53,hdl 73,ldl 142  10/16/12 chol 249,trig 74,hdl 91,ldl 143  9/17/13 chol 225,trig 66,hdl 76,ldl 136  10/11/13 chol 245,trig 74,hdl 82,ldl 148  11/22/14 chol 216,trig 60,hdl 81,ldl 123    11/25/15 chol 204,trig 54,hdl 76,ldl 117  7/30/16 chol 254,trig 66,hdl 92,ldl 149  2/13/17 chl 239,trig 53,hdl 96,ldl 132  5/11/18 chol 263,trig 96,hdl 96,ldl 152  7/24/19 chol 269,trig 64,hdl 99,ldl 157; 10 year risk 0.7%  6/28/20 chol 278,trig 68,hdl 98,ldl 166  5/11/21 chol 280,trig 76,hdl 100,ldl 168; 10 year risk 1.1%  4/12/22 chol 262,trig 74,hdl 97,ldl 153; 10 year risk 2.87%     history abd pain  5/17/17 gyn ultrasound at [facility] per gyn dr.farringer small mildly complex 1.9 cm left ovarian cyst  12/15/17 chronic right lower quadrant, right upper  quadrant pain, labs ordered, CT abdomen and pelvis ordered, old records, gyn ultrasound pelvic from [facility] done in may, referral [facility]  12/22/217 cbc,cmp,lipase negative  1/9/18 CT abdomen and pelvis with done at [facility] imaging, prominent stool cecum and ascending colon suggesting constipation  1/19/18 colon per [facility] 4 mm polyp sigmoid colon, pathology post inflammatory polyp, follow-up 5 years based upon results     History depression  Had been on prozac many yrs ago  12/2/10 start zoloft 50 mg  1/11 off zoloft  3/14/13 start zoloft    11/18/14 off zoloft  8/4/19 now agrees to SSRI trial via [employer], will start zoloft 25 mg for perimenopausal symptoms, sent to pharmacy advised to schedule appointment for follow-up  7/13/20 start zoloft 25 mg  5/6/21 off zoloft     history shoulder pain  5/16/13 x-ray of shoulder minimal DTT acromioclavicular joint  5/16/13 MRI left shoulder moderate acromioclavicular edema suggesting grade 1 sprain or overuse, intact rotator cuff and labrum  9/23/13 saw [facility] no report yet, surgery recommended, would like second opinion, referral to  pending  2/26/14  ortho note; left a.c. joint injection for bursitis, repeat a.c. joint or possible subacromial or subcoracoid injection in the future if symptoms persist  9/22/16  [facility] note anterior right shoulder pain unclear etiology, referred for MRI  10/5/16 MRI right shoulder tendinosis supraspinatus, mild DJD right before meals with significant marrow edema and soft tissue edema, likely external impingement supraspinatus  12/20/16  orthopedic note persistent right shoulder pain despite steroid injection, right shoulder rotator cuff syndrome and before meals injury, possible subcoracoid impingement, offered repeat injection versus arthroscopy, patient will consider     History UTI  3/14/13 UTI e.coli sensitive to all antibiotics  6/24/14 UTI e.coli    9/29/15 UTI symptoms self treated with cipro    8/25/17  UTI proteus sensitive to all antbiotics except nitrofurantoin, started on bactrim  9/1/17 use cipro prn UTI symptoms  5/11/18 UA negative  7/24/19 UA negative  6/29/20 UA negative  5/11/21 UA negative      Low back pain  11/09 MRI LS L4-5 disc reveals minimal disc bulging   12/21/16 MRI lumbar spine slight interval progression of lumbar spondylosis compared to 2009 per  [facility]   2/6/17 has tried anti-inflammatories, prednisone, narcotics, physical therapy, acupuncture no benefit     osteopenia  3/30/22 dexa at [facility] LS-1.6,hip-2.4; FRAX major 7.6%, hip 1.5%  4/12/22 vit d 21    Perimenopausal  7/15/19 labs ordered, hot flashes, declines HRT or SSRI, monitor symptoms likely postmenopausal  7/24/19 estradiol 7.7,LH 40,FSH 93  8/4/19 now agrees to SSRI trial via [employer], will start zoloft 25 mg  5/11/20 did not start zoloft  7/13/20 start zoloft 25 mg  5/6/21 off zoloft      Preventative health  6/17/16 td in [place]  1/19/18 colon per [facility] 4 mm polyp sigmoid colon, pathology post inflammatory polyp, follow-up 5 years based upon results  11/27/18  eye exam  7/24/19 estradiol 7.7,LH 40,FSH 93  5/6/20 pap negative per   4/29/21 covid pfizer second  3/21/22 declines mammogram and other vaccines  3/30/22 dexa at [facility] LS-1.6,hip-2.4; FRAX major 7.6%, hip 1.5%  4/12/22 vit d 21  4/12/22 A1c 5.4%     Status post appendectomy            Patient Active Problem List   Diagnosis    S/P appendectomy    Preventative health care    Low back pain    Dyslipidemia    History of depression    History of shoulder pain    History of UTI    History of abdominal pain    Ovarian cyst    Perimenopausal    Osteopenia     Depression Screening  Little interest or pleasure in doing things?  0 - not at all  Feeling down, depressed , or hopeless? 0 - not at all  Patient Health Questionnaire Score: 0                  Patient Care Team:  Chago Fontenot M.D. as PCP - General (Internal Medicine)      ROS  Has had  "some change in her urine odor, some urinary irritation particular in the morning, trying to drink water and keep well-hydrated, no blood in the urine         Objective     /60 (BP Location: Left arm, Patient Position: Sitting, BP Cuff Size: Adult)   Pulse 62   Temp 36.2 °C (97.2 °F)   Ht 1.715 m (5' 7.5\")   Wt 54 kg (119 lb)   LMP 01/01/2020 (Approximate)   BMI 18.36 kg/m²      Physical Exam  Vitals and nursing note reviewed.   HENT:      Head: Normocephalic and atraumatic.      Right Ear: External ear normal.      Left Ear: External ear normal.   Eyes:      Conjunctiva/sclera: Conjunctivae normal.   Cardiovascular:      Rate and Rhythm: Normal rate and regular rhythm.   Pulmonary:      Effort: Pulmonary effort is normal.      Breath sounds: Normal breath sounds.   Abdominal:      General: There is no distension.   Musculoskeletal:         General: No swelling.   Skin:     Findings: No bruising.   Psychiatric:         Mood and Affect: Mood normal.         Behavior: Behavior normal.                           Assessment & Plan        Assessment  #! Annual exam     #2 dyslipidemia 4/12/22 chol 262,trig 74,hdl 97,ldl 153; 10 year risk 2.87%     #3 urine odor, no blood in urine, more noticeable in am suspect UTI    #4 osteopenia  3/30/22 dexa at [facility] LS-1.6,hip-2.4; FRAX major 7.6%, hip 1.5%    #5 dyslipidemia 4/12/22 chol 262,trig 74,hdl 97,ldl 153; 10 year risk 2.87%    #6 history of COVID infection in December no sequela    Plan   #!  Labs ordered to be done at [facility], if she does not hear from our office within 2 days after completion of the labs she will let me know sometimes we do not receive the results from [facility]    #2 point-of-care urinalysis for urine symptoms, positive, will send for culture, empirically treat with Bactrim DS 1 p.o. twice daily x3 days, she has had that before without side effects, will notify her with the urine culture result to be done at [facility] but that may take 2 to 3 days    #3 " declines mammogram    #4 history of inflammatory colon polyp, digestive health recommended follow-up 5 years which would be now, she declines follow-up colonoscopy for now and will notify me when she would like to do the colonoscopy, understanding that I do believe she should have the colonoscopy done to follow-up on that inflammatory polyp 5 years ago    #5 repeat bone density next year, continue her weightbearing exercise, calcium 1000 mg daily, we will check her vitamin D level on the labs    #6 old records gynecology, up-to-date on Pap smear from November    #7 declines all vaccines    #8 continue work on a good nutrition and exercise program    #9 she would like to have a CT calcium test done which we have discussed previously, she understands this would be an out-of-pocket expense and she would like to have that done at [facility], order completed and provided to her and we did fax the order to [facility] as well    #10 follow-up 1 year

## 2023-03-06 NOTE — LETTER
[facility]  Chago Fontenot M.D.  [id number]  [place] [address]  Fax: [phone]   Authorization for Release/Disclosure of   Protected Health Information   Name: ADARSH HOUSTON : 1965 SSN: [id number]   Address: [address] [place] [address] [place] [address] Phone:    [phone] (home) [phone] (work)   I authorize the entity listed below to release/disclose the PHI below to:   [facility]/Chago Fontenot M.D. and Chago Fontenot M.D.   Provider or Entity Name:                                                   [facility]   Address   City, [facility], [facility]   Phone:      Fax:               [phone]   Reason for request: continuity of care   Information to be released: OLD RECORDS   [  ] LAST COLONOSCOPY,  including any PATH REPORT and follow-up  [  ] LAST FIT/COLOGUARD RESULT [  ] LAST DEXA  [  ] LAST MAMMOGRAM  [  ] LAST PAP  [  ] LAST LABS [  ] RETINA EXAM REPORT  [  ] IMMUNIZATION RECORDS  [ x ] Release all info      [  ] Check here and initial the line next to each item to release ALL health information INCLUDING  _____ Care and treatment for drug and / or alcohol abuse  _____ HIV testing, infection status, or AIDS  _____ Genetic Testing    DATES OF SERVICE OR TIME PERIOD TO BE DISCLOSED: _____________  I understand and acknowledge that:  * This Authorization may be revoked at any time by you in writing, except if your health information has already been used or disclosed.  * Your health information that will be used or disclosed as a result of you signing this authorization could be re-disclosed by the recipient. If this occurs, your re-disclosed health information may no longer be protected by State or Federal laws.  * You may refuse to sign this Authorization. Your refusal will not affect your ability to obtain treatment.  * This Authorization becomes effective upon signing and will  on (date) __________.      If no date is indicated, this Authorization will   one (1) year from the signature date.    Name: Frank Fabian  Signature:                       Continuity of Care   Date:   3/14/2023     PLEASE FAX REQUESTED RECORDS BACK TO: [phone]

## 2023-03-20 LAB — HBA1C MFR BLD: 5.2 % (ref ?–5.8)

## 2023-03-22 ENCOUNTER — TELEPHONE (OUTPATIENT)
Dept: MEDICAL GROUP | Facility: MEDICAL CENTER | Age: 58
End: 2023-03-22
Payer: COMMERCIAL

## 2023-03-22 NOTE — TELEPHONE ENCOUNTER
Notified with labs done at [facility] on March 20, cholesterol stable, she has the CT cardiac calcium test at [facility] scheduled for next week Wednesday we will notify her with that result, WBC 3.7 normal range lower end for [facility] is at 3.8, normal differential, bilirubin 1.3 with normal alkaline phosphatase, AST, ALT, vitamin D normal low and 32, recommend she take 2000 units daily, otherwise continue her good nutrition and exercise program.  Repeat labs 1 year.

## 2023-03-29 ENCOUNTER — TELEPHONE (OUTPATIENT)
Dept: MEDICAL GROUP | Facility: MEDICAL CENTER | Age: 58
End: 2023-03-29
Payer: COMMERCIAL

## 2023-03-29 DIAGNOSIS — N95.1 PERIMENOPAUSAL: ICD-10-CM

## 2023-03-29 DIAGNOSIS — Z86.59 HISTORY OF DEPRESSION: ICD-10-CM

## 2023-03-29 RX ORDER — SERTRALINE HYDROCHLORIDE 25 MG/1
25 TABLET, FILM COATED ORAL
Qty: 90 TABLET | Refills: 1 | Status: SHIPPED | OUTPATIENT
Start: 2023-03-29 | End: 2023-08-25 | Stop reason: SDUPTHER

## 2023-03-29 NOTE — TELEPHONE ENCOUNTER
Notified with results of CT calcium score done at [facility], score equals 0.  Notified patient, no need for statin therapy continue he good nutrition and exercise program.    Previously she was on sertraline 25 mg a day 2 years ago for perimenopausal symptoms, she would like to resume that medication, she did not have side effects, medication can cause nausea, mood changes, insomnia, prescription sent to her pharmacy, if no improvement or worsening symptoms she will schedule an appointment.

## 2023-04-28 ENCOUNTER — TELEPHONE (OUTPATIENT)
Dept: MEDICAL GROUP | Facility: MEDICAL CENTER | Age: 58
End: 2023-04-28
Payer: COMMERCIAL

## 2023-04-28 NOTE — TELEPHONE ENCOUNTER
Please call [facility], the patient had laboratory tests done March 20, 2023 at [email or web address],patient ID: [id number] and specimen ID [id number].      She states that the vitamin D is not being covered, please make sure that [email or web address] used the appropriate codes for the test to include: Z00.00 and E55.9

## 2023-05-12 ENCOUNTER — TELEPHONE (OUTPATIENT)
Dept: MEDICAL GROUP | Facility: MEDICAL CENTER | Age: 58
End: 2023-05-12
Payer: COMMERCIAL

## 2023-05-12 NOTE — TELEPHONE ENCOUNTER
Spoke with [facility] billing office, codes added. Resubmitted. Pt notified.

## 2023-07-05 ENCOUNTER — HOSPITAL ENCOUNTER (OUTPATIENT)
Dept: RADIOLOGY | Facility: MEDICAL CENTER | Age: 58
End: 2023-07-05
Payer: COMMERCIAL

## 2024-02-23 RX ORDER — SERTRALINE HYDROCHLORIDE 25 MG/1
25 TABLET, FILM COATED ORAL
Qty: 90 TABLET | Refills: 0 | Status: SHIPPED | OUTPATIENT
Start: 2024-02-23

## 2024-05-02 ENCOUNTER — APPOINTMENT (OUTPATIENT)
Dept: MEDICAL GROUP | Facility: MEDICAL CENTER | Age: 59
End: 2024-05-02
Payer: COMMERCIAL

## 2024-05-02 ENCOUNTER — TELEPHONE (OUTPATIENT)
Dept: MEDICAL GROUP | Facility: MEDICAL CENTER | Age: 59
End: 2024-05-02

## 2024-05-02 VITALS
HEART RATE: 54 BPM | WEIGHT: 117 LBS | BODY MASS INDEX: 18.36 KG/M2 | DIASTOLIC BLOOD PRESSURE: 62 MMHG | OXYGEN SATURATION: 98 % | SYSTOLIC BLOOD PRESSURE: 100 MMHG | HEIGHT: 67 IN | TEMPERATURE: 97 F

## 2024-05-02 DIAGNOSIS — E55.9 AVITAMINOSIS D: ICD-10-CM

## 2024-05-02 DIAGNOSIS — Z12.31 ENCOUNTER FOR SCREENING MAMMOGRAM FOR BREAST CANCER: ICD-10-CM

## 2024-05-02 DIAGNOSIS — Z12.11 COLON CANCER SCREENING: ICD-10-CM

## 2024-05-02 DIAGNOSIS — E55.9 VITAMIN D DEFICIENCY: ICD-10-CM

## 2024-05-02 DIAGNOSIS — M81.0 POSTMENOPAUSAL BONE LOSS: ICD-10-CM

## 2024-05-02 DIAGNOSIS — Z00.00 PREVENTATIVE HEALTH CARE: ICD-10-CM

## 2024-05-02 DIAGNOSIS — N95.1 PERIMENOPAUSAL: ICD-10-CM

## 2024-05-02 DIAGNOSIS — Z86.59 HISTORY OF DEPRESSION: ICD-10-CM

## 2024-05-02 PROCEDURE — 99396 PREV VISIT EST AGE 40-64: CPT | Performed by: INTERNAL MEDICINE

## 2024-05-02 PROCEDURE — 3074F SYST BP LT 130 MM HG: CPT | Performed by: INTERNAL MEDICINE

## 2024-05-02 PROCEDURE — 3078F DIAST BP <80 MM HG: CPT | Performed by: INTERNAL MEDICINE

## 2024-05-02 NOTE — LETTER
[facility]  Chago Fontenot M.D.  [address]  [place] [address]  Fax: [phone]   Authorization for Release/Disclosure of   Protected Health Information   Name: ADARSH HOUSTON : 1965 SSN: [id number]   Address: [address] [place] [address] [place] [address] Phone:    [phone] (home) [phone] (work)   I authorize the entity listed below to release/disclose the PHI below to:   [facility]/Chago Fontenot M.D. and Chago Fontenot M.D.   Provider or Entity Name:                                                               [facility]   Address   City, State, [facility]   Phone:      Fax:                 [phone]   Reason for request: continuity of care   Information to be released: OLD RECORDS   [  ] LAST COLONOSCOPY,  including any PATH REPORT and follow-up  [  ] LAST FIT/COLOGUARD RESULT [  ] LAST DEXA  [  ] LAST MAMMOGRAM  [  ] LAST PAP  [  ] LAST LABS [  ] RETINA EXAM REPORT  [  ] IMMUNIZATION RECORDS  [ x ] Release all info      [  ] Check here and initial the line next to each item to release ALL health information INCLUDING  _____ Care and treatment for drug and / or alcohol abuse  _____ HIV testing, infection status, or AIDS  _____ Genetic Testing    DATES OF SERVICE OR TIME PERIOD TO BE DISCLOSED: _____________  I understand and acknowledge that:  * This Authorization may be revoked at any time by you in writing, except if your health information has already been used or disclosed.  * Your health information that will be used or disclosed as a result of you signing this authorization could be re-disclosed by the recipient. If this occurs, your re-disclosed health information may no longer be protected by State or Federal laws.  * You may refuse to sign this Authorization. Your refusal will not affect your ability to obtain treatment.  * This Authorization becomes effective upon signing and will  on (date) __________.      If no date is indicated, this Authorization will  one  (1) year from the signature date.    Name: Frank Fabian  Signature:                           Continuity of Care Date:   5/3/2024     PLEASE FAX REQUESTED RECORDS BACK TO: [phone]

## 2024-05-02 NOTE — PROGRESS NOTES
Subjective     Frank Fabian is a 58 y.o. female who presents with Annual Exam            HPI      Here for annual exam   Medications, allergies, medical history, surgical history, social history, family history  reviewed and updated  Eye exam annually glasses for reading no night driving issues  Dentist twice per year   Hearing no changes  Work stress IGT   Saw [facility] in March and tried estrogen patch and progesterone, tried for two weeks at 0.05 mg and then decreased to 0.025 mg and after another 2 weeks then stopped last month due to side effects of vaginal bleeding and hot flashes, fatigue, hair loss. Since stopping the estrogen patch and progesterone has felt better. Also given estradiol vaginal cream 0.1% but only taking 1/2 dose once per week.   Exercise cardio once per week running and jumping up to 30 minutes and weight training 2 times per week 30 minutes, stretching once per week.   Tries to eat healthy, fruits and veggies in diet, does own cooking for tarts and sweets and tries to limit sweets in general. Does not purchase sweets from stores. Water intake 1 liter per day, coffee one per day, no soda, does enjoy tea, etoh rare. No tobacco.   Has been off zoloft, she tried to 25 mg without side effects but did not notice any improvement in symptoms so discontinued that medication.  Does have some anxiety at times.    Current Outpatient Medications   Medication Sig Dispense Refill    sertraline (ZOLOFT) 25 MG tablet Take 1 Tablet by mouth every day. 90 Tablet 0     No current facility-administered medications for this visit.             Dyslipidemia  10/10 chol 239,trig 71,hdl 81,ldl 144  7/12 chol 226,trig 53,hdl 73,ldl 142  10/16/12 chol 249,trig 74,hdl 91,ldl 143  9/17/13 chol 225,trig 66,hdl 76,ldl 136  10/11/13 chol 245,trig 74,hdl 82,ldl 148  11/22/14 chol 216,trig 60,hdl 81,ldl 123    11/25/15 chol 204,trig 54,hdl 76,ldl 117  7/30/16 chol 254,trig 66,hdl 92,ldl 149  2/13/17 chl  239,trig 53,hdl 96,ldl 132  5/11/18 chol 263,trig 96,hdl 96,ldl 152  7/24/19 chol 269,trig 64,hdl 99,ldl 157; 10 year risk 0.7%  6/28/20 chol 278,trig 68,hdl 98,ldl 166  5/11/21 chol 280,trig 76,hdl 100,ldl 168; 10 year risk 1.1%  4/12/22 chol 262,trig 74,hdl 97,ldl 153; 10 year risk 2.87%  3/20/23 chol 267,trig 65,hdl 91,ldl 160 labs done at [facility]; 10 year risk 1.1%  3/29/23 CT coronary calcium score at [facility] = 0.0     history abd pain  5/17/17 gyn ultrasound at [facility] per gyn  small mildly complex 1.9 cm left ovarian cyst  12/15/17 chronic right lower quadrant, right upper quadrant pain, labs ordered, CT abdomen and pelvis ordered, old records, gyn ultrasound pelvic from [facility]'s done in may, referral [facility]  12/22/217 cbc,cmp,lipase negative  1/9/18 CT abdomen and pelvis with done at [facility] imaging, prominent stool cecum and ascending colon suggesting constipation  1/19/18 colon per [facility] 4 mm polyp sigmoid colon, pathology post inflammatory polyp, follow-up 5 years based upon results    history covid  12/31/22 covid infection      History depression  Had been on prozac many yrs ago  12/2/10 start zoloft 50 mg  1/11 off zoloft  3/14/13 start zoloft    11/18/14 off zoloft  8/4/19 now agrees to SSRI trial via [employer], will start zoloft 25 mg for perimenopausal symptoms, sent to pharmacy advised to schedule appointment for follow-up  7/13/20 start zoloft 25 mg  5/6/21 off zoloft  3/29/23 start zoloft 25 mg again   Tried prozac and zoloft     history shoulder pain  5/16/13 x-ray of shoulder minimal DTT acromioclavicular joint  5/16/13 MRI left shoulder moderate acromioclavicular edema suggesting grade 1 sprain or overuse, intact rotator cuff and labrum  9/23/13 saw [facility] no report yet, surgery recommended, would like second opinion, referral to  pending  2/26/14  ortho note; left a.c. joint injection for bursitis, repeat a.c. joint or possible subacromial or subcoracoid injection in the  future if symptoms persist  9/22/16 dr.albertson BERKOWITZ note anterior right shoulder pain unclear etiology, referred for MRI  10/5/16 MRI right shoulder tendinosis supraspinatus, mild DJD right before meals with significant marrow edema and soft tissue edema, likely external impingement supraspinatus  12/20/16  orthopedic note persistent right shoulder pain despite steroid injection, right shoulder rotator cuff syndrome and before meals injury, possible subcoracoid impingement, offered repeat injection versus arthroscopy, patient will consider     History UTI  3/14/13 UTI e.coli sensitive to all antibiotics  6/24/14 UTI e.coli    9/29/15 UTI symptoms self treated with cipro    8/25/17 UTI proteus sensitive to all antbiotics except nitrofurantoin, started on bactrim  9/1/17 use cipro prn UTI symptoms  5/11/18 UA negative  7/24/19 UA negative  6/29/20 UA negative  5/11/21 UA negative  3/6/23 poc urinalysis positive, send urine for culture, start bactrim empirically      Low back pain  11/09 MRI LS L4-5 disc reveals minimal disc bulging   12/21/16 MRI lumbar spine slight interval progression of lumbar spondylosis compared to 2009 per dr.albertson BERKOWITZ   2/6/17 has tried anti-inflammatories, prednisone, narcotics, physical therapy, acupuncture no benefit     osteopenia  3/30/22 dexa at [facility] LS-1.6,hip-2.4; FRAX major 7.6%, hip 1.5%  4/12/22 vit d 21  3/20/23 vit d 32 labs done at [facility]     Perimenopausal  7/15/19 labs ordered, hot flashes, declines HRT or SSRI, monitor symptoms likely postmenopausal  7/24/19 estradiol 7.7,LH 40,FSH 93  8/4/19 now agrees to SSRI trial via [employer], will start zoloft 25 mg  5/11/20 did not start zoloft  7/13/20 start zoloft 25 mg  5/6/21 off zoloft 3/29/23 start zoloft 25 mg again      Preventative health  6/17/16 td in [place]  1/19/18 colon per [facility] 4 mm polyp sigmoid colon, pathology post inflammatory polyp, follow-up 5 years based upon results  7/24/19 estradiol 7.7,LH 40,FSH  "93  3/30/22 dexa at [facility] LS-1.6,hip-2.4; FRAX major 7.6%, hip 1.5%  11/22/22 pap per gyn stanislav walker  3/6/23 declines mammogram  3/6/23 declines vaccines  3/6/23 declines colon  3/20/23 vit d 32 labs done at [facility]  3/20/23 A1c 5.2% labs done at [facility]  3/20/23 hep c ab negative labs done at [facility]     Status post appendectomy            Patient Active Problem List   Diagnosis    S/P appendectomy    Preventative health care    Low back pain    Dyslipidemia    History of depression    History of shoulder pain    History of UTI    History of abdominal pain    Ovarian cyst    Perimenopausal    Osteopenia    History of COVID-19                     Patient Care Team:  Chago Fontenot M.D. as PCP - General (Internal Medicine)      ROS     No chest pain, shortness of breath, palpitations  No reflux, no constipation, no change in bowel movements  No urinary incontinence  Vaginal dryness improved with vaginal estrogen once a week  Occasional left knee discomfort when running on concrete, no swelling of the left knee, no trauma      Objective     /62   Pulse (!) 54   Temp 36.1 °C (97 °F) (Temporal)   Ht 1.702 m (5' 7\")   Wt 53.1 kg (117 lb)   LMP 01/01/2020 (Approximate)   SpO2 98%   BMI 18.32 kg/m²      Physical Exam  Vitals and nursing note reviewed.   Constitutional:       Appearance: Normal appearance.   HENT:      Head: Normocephalic and atraumatic.      Right Ear: Tympanic membrane and external ear normal.      Left Ear: Tympanic membrane and external ear normal.      Nose: No congestion.      Mouth/Throat:      Mouth: Mucous membranes are moist.      Pharynx: No oropharyngeal exudate.   Eyes:      General:         Right eye: No discharge.         Left eye: No discharge.      Conjunctiva/sclera: Conjunctivae normal.   Cardiovascular:      Rate and Rhythm: Normal rate and regular rhythm.      Heart sounds: Normal heart sounds.   Pulmonary:      Effort: No respiratory distress.      Breath sounds: Normal breath " sounds.   Abdominal:      General: There is no distension.   Musculoskeletal:         General: No swelling.      Cervical back: Neck supple. No rigidity.   Lymphadenopathy:      Cervical: No cervical adenopathy.   Skin:     Coloration: Skin is not jaundiced.      Findings: No bruising.   Neurological:      General: No focal deficit present.      Mental Status: She is alert.   Psychiatric:         Mood and Affect: Mood normal.          Left knee no crepitus, no edema, no effusion, normal range of motion, no lower extremity edema bilateral                   Assessment & Plan        Assessment   #! Annual exam     #2  Postmenopausal, did see [facility] gynecology tried estradiol patch and progesterone but had side effects and is intolerant and did discontinue that, did start vaginal estrogen 0.01% per gynecology once a week with benefit for vaginal dryness    #3 dyslipidemia diet controlled, CT calcium score last year 0    #4 anxiety, not taking Zoloft currently, did try Zoloft 25 mg daily without side effects but did not experience any benefit but was on that short-term    #5 preventative health due for follow-up colonoscopy [facility]     #6 history of low vitamin D      Plan  #! Health maintenance reviewed and updated    #2 mammogram     #3 [facility] colon cancer screening request per patient to see  she is also going to check if they require her to be seen before the colonoscopy if so she will check with digestive health    #4 dexa postmenopausal bone loss     #5 start zoloft 25 mg again for one week as she had before and then increase to 50 mg and let me know if she develops side effects or if the medication still is not effective at a low dose, monitor for nausea, lightheadedness, dizziness, she did not experience side effects at the 25 mg dose    #6 continue vaginal estrogen half a dose weekly    #7 preventative labs ordered    #8 continue working on good nutrition and exercise program, calcium 1000 mg daily, vitamin D  2000 units daily pending labs    #9 old records gynecology    #10 follow-up 1 year

## 2024-05-07 ENCOUNTER — TELEPHONE (OUTPATIENT)
Dept: MEDICAL GROUP | Facility: MEDICAL CENTER | Age: 59
End: 2024-05-07
Payer: COMMERCIAL

## 2024-05-07 LAB
25(OH)D3+25(OH)D2 SERPL-MCNC: 26.8 NG/ML (ref 30–100)
ALBUMIN SERPL-MCNC: 4.1 G/DL (ref 3.8–4.9)
ALBUMIN/GLOB SERPL: 1.9 {RATIO} (ref 1.2–2.2)
ALP SERPL-CCNC: 79 IU/L (ref 44–121)
ALT SERPL-CCNC: 13 IU/L (ref 0–32)
APPEARANCE UR: CLEAR
AST SERPL-CCNC: 19 IU/L (ref 0–40)
BACTERIA #/AREA URNS HPF: NORMAL /[HPF]
BACTERIA UR CULT: NORMAL
BACTERIA UR CULT: NORMAL
BASOPHILS # BLD AUTO: 0 X10E3/UL (ref 0–0.2)
BASOPHILS NFR BLD AUTO: 1 %
BILIRUB SERPL-MCNC: 1.5 MG/DL (ref 0–1.2)
BILIRUB UR QL STRIP: NEGATIVE
BUN SERPL-MCNC: 12 MG/DL (ref 6–24)
BUN/CREAT SERPL: 13 (ref 9–23)
CALCIUM SERPL-MCNC: 8.9 MG/DL (ref 8.7–10.2)
CASTS URNS MICRO: NORMAL
CASTS URNS QL MICRO: NORMAL /LPF
CHLORIDE SERPL-SCNC: 106 MMOL/L (ref 96–106)
CHOLEST SERPL-MCNC: 245 MG/DL (ref 100–199)
CO2 SERPL-SCNC: 22 MMOL/L (ref 20–29)
COLOR UR: YELLOW
CREAT SERPL-MCNC: 0.92 MG/DL (ref 0.57–1)
CRYSTALS URNS MICRO: NORMAL
EGFRCR SERPLBLD CKD-EPI 2021: 73 ML/MIN/1.73
EOSINOPHIL # BLD AUTO: 0.2 X10E3/UL (ref 0–0.4)
EOSINOPHIL NFR BLD AUTO: 7 %
EPI CELLS #/AREA URNS HPF: NORMAL /HPF (ref 0–10)
ERYTHROCYTE [DISTWIDTH] IN BLOOD BY AUTOMATED COUNT: 11.8 % (ref 11.7–15.4)
GLOBULIN SER CALC-MCNC: 2.2 G/DL (ref 1.5–4.5)
GLUCOSE SERPL-MCNC: 84 MG/DL (ref 70–99)
GLUCOSE UR QL STRIP: NEGATIVE
HBA1C MFR BLD: 5.4 % (ref 4.8–5.6)
HCT VFR BLD AUTO: 41.4 % (ref 34–46.6)
HDLC SERPL-MCNC: 86 MG/DL
HGB BLD-MCNC: 13.6 G/DL (ref 11.1–15.9)
HGB UR QL STRIP: NEGATIVE
IMM GRANULOCYTES # BLD AUTO: 0 X10E3/UL (ref 0–0.1)
IMM GRANULOCYTES NFR BLD AUTO: 0 %
IMMATURE CELLS  115398: NORMAL
KETONES UR QL STRIP: NEGATIVE
LABORATORY COMMENT REPORT: ABNORMAL
LDLC SERPL CALC-MCNC: 148 MG/DL (ref 0–99)
LEUKOCYTE ESTERASE UR QL STRIP: ABNORMAL
LYMPHOCYTES # BLD AUTO: 1.2 X10E3/UL (ref 0.7–3.1)
LYMPHOCYTES NFR BLD AUTO: 32 %
MCH RBC QN AUTO: 30.5 PG (ref 26.6–33)
MCHC RBC AUTO-ENTMCNC: 32.9 G/DL (ref 31.5–35.7)
MCV RBC AUTO: 93 FL (ref 79–97)
MICRO URNS: ABNORMAL
MICRO URNS: ABNORMAL
MONOCYTES # BLD AUTO: 0.3 X10E3/UL (ref 0.1–0.9)
MONOCYTES NFR BLD AUTO: 8 %
MORPHOLOGY BLD-IMP: NORMAL
MUCOUS THREADS URNS QL MICRO: NORMAL
NEUTROPHILS # BLD AUTO: 1.9 X10E3/UL (ref 1.4–7)
NEUTROPHILS NFR BLD AUTO: 52 %
NITRITE UR QL STRIP: NEGATIVE
NRBC BLD AUTO-RTO: NORMAL %
PH UR STRIP: 5.5 [PH] (ref 5–7.5)
PLATELET # BLD AUTO: 239 X10E3/UL (ref 150–450)
POTASSIUM SERPL-SCNC: 4.2 MMOL/L (ref 3.5–5.2)
PROT SERPL-MCNC: 6.3 G/DL (ref 6–8.5)
PROT UR QL STRIP: NEGATIVE
RBC # BLD AUTO: 4.46 X10E6/UL (ref 3.77–5.28)
RBC #/AREA URNS HPF: NORMAL /HPF (ref 0–2)
RENAL EPI CELLS #/AREA URNS HPF: NORMAL /[HPF]
SODIUM SERPL-SCNC: 142 MMOL/L (ref 134–144)
SP GR UR STRIP: 1.02 (ref 1–1.03)
T VAGINALIS URNS QL MICRO: NORMAL
TRIGL SERPL-MCNC: 64 MG/DL (ref 0–149)
TSH SERPL DL<=0.005 MIU/L-ACNC: 1.35 UIU/ML (ref 0.45–4.5)
TSH SERPL DL<=0.005 MIU/L-ACNC: NORMAL UIU/ML
UNIDENT CRYS URNS QL MICRO: NORMAL
URINALYSIS REFLEX  377202: ABNORMAL
URNS CMNT MICRO: NORMAL
UROBILINOGEN UR STRIP-MCNC: 0.2 MG/DL (ref 0.2–1)
VLDLC SERPL CALC-MCNC: 11 MG/DL (ref 5–40)
WBC # BLD AUTO: 3.6 X10E3/UL (ref 3.4–10.8)
WBC #/AREA URNS HPF: NORMAL /HPF (ref 0–5)
YEAST #/AREA URNS HPF: NORMAL /[HPF]

## 2024-05-08 NOTE — TELEPHONE ENCOUNTER
Notified with labs, cholesterol stable, CT calcium score last year 0 thus no statin therapy is indicated.  No diabetes, normal renal and hepatic function vitamin D still slightly decreased, recommend she start over-the-counter vitamin D 2000 units daily.

## 2025-03-18 NOTE — TELEPHONE ENCOUNTER
Home meds reconciled.      Notified with results, LDL elevated but HDL more than twice normal.  No diabetes, continue vitamin D supplementation, nutrition, and exercise program

## 2025-05-05 ENCOUNTER — APPOINTMENT (OUTPATIENT)
Dept: MEDICAL GROUP | Facility: MEDICAL CENTER | Age: 60
End: 2025-05-05
Payer: COMMERCIAL

## 2025-05-05 VITALS
DIASTOLIC BLOOD PRESSURE: 48 MMHG | WEIGHT: 119 LBS | HEIGHT: 66 IN | BODY MASS INDEX: 19.13 KG/M2 | HEART RATE: 63 BPM | TEMPERATURE: 99 F | SYSTOLIC BLOOD PRESSURE: 120 MMHG | OXYGEN SATURATION: 97 %

## 2025-05-05 DIAGNOSIS — E78.5 DYSLIPIDEMIA: Chronic | ICD-10-CM

## 2025-05-05 DIAGNOSIS — Z12.4 CERVICAL CANCER SCREENING: ICD-10-CM

## 2025-05-05 DIAGNOSIS — Z86.59 HISTORY OF DEPRESSION: ICD-10-CM

## 2025-05-05 DIAGNOSIS — Z00.00 PREVENTATIVE HEALTH CARE: ICD-10-CM

## 2025-05-05 DIAGNOSIS — Z78.0 POSTMENOPAUSE: ICD-10-CM

## 2025-05-05 PROCEDURE — 3078F DIAST BP <80 MM HG: CPT | Performed by: INTERNAL MEDICINE

## 2025-05-05 PROCEDURE — 99396 PREV VISIT EST AGE 40-64: CPT | Performed by: INTERNAL MEDICINE

## 2025-05-05 PROCEDURE — 3074F SYST BP LT 130 MM HG: CPT | Performed by: INTERNAL MEDICINE

## 2025-05-05 RX ORDER — SERTRALINE HYDROCHLORIDE 25 MG/1
25 TABLET, FILM COATED ORAL
Qty: 90 TABLET | Refills: 3 | Status: SHIPPED | OUTPATIENT
Start: 2025-05-05

## 2025-05-05 ASSESSMENT — FIBROSIS 4 INDEX: FIB4 SCORE: 1.26

## 2025-05-05 NOTE — PROGRESS NOTES
Subjective     Frank Fabian is a 59 y.o. 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    and daughter in law school at [employer] and one son at home in high school, both doing well academically  Tries to cooperate healthy fruits and vegetables into her program, usually will skip breakfast, eats a late breakfast or early lunch with oatmeal and fruits, dinner may consist of soup, meat or fish, beans,  will also cook at times.  Tries to drink a fair amount of water daily,one  coffee in the morning, decaffeinated tea the rest of the day herbal decaffeinated tea, no soda, no regular alcohol, tries to get good sleep about 7 hours per night.  Previously she went off sertraline, started for hot flashes and perimenopausal symptoms in the past, had been off the sertraline for a year but in December started to get a bit more hot flashes and mood changes so she resumed the sertraline 25 mg last month with improvement in her symptoms.  No HRT.  No tobacco  Sister lives in [facility], family history of dyslipidemia                 No current outpatient medications on file.     No current facility-administered medications for this visit.         Dyslipidemia  10/10 chol 239,trig 71,hdl 81,ldl 144  7/12 chol 226,trig  53,hdl 73,ldl 142  10/16/12 chol 249,trig 74,hdl 91,ldl 143  9/17/13 chol 225,trig 66,hdl 76,ldl 136  10/11/13 chol 245,trig 74,hdl 82,ldl 148  11/22/14 chol 216,trig 60,hdl 81,ldl 123    11/25/15 chol 204,trig 54,hdl 76,ldl 117  7/30/16 chol 254,trig 66,hdl 92,ldl 149  2/13/17 chl 239,trig 53,hdl 96,ldl 132  5/11/18 chol 263,trig 96,hdl 96,ldl 152  7/24/19 chol 269,trig 64,hdl 99,ldl 157; 10 year risk 0.7%  6/28/20 chol 278,trig 68,hdl 98,ldl 166  5/11/21 chol 280,trig 76,hdl 100,ldl 168; 10 year risk 1.1%  4/12/22 chol 262,trig 74,hdl 97,ldl 153; 10 year risk 2.87%  3/20/23 chol 267,trig 65,hdl 91,ldl 160 labs done at [facility]; 10 year risk 1.1%  3/29/23 CT coronary calcium score at [facility] = 0.0  5/7/24 chol 245,trig 64,hdl 86,ldl 148     history abd pain  5/17/17 gyn ultrasound at [facility] per gyn  small mildly complex 1.9 cm left ovarian cyst  12/15/17 chronic right lower quadrant, right upper quadrant pain, labs ordered, CT abdomen and pelvis ordered, old records, gyn ultrasound pelvic from [facility]'s done in may, referral [facility]  12/22/217 cbc,cmp,lipase negative  1/9/18 CT abdomen and pelvis with done at [facility] imaging, prominent stool cecum and ascending colon suggesting constipation  1/19/18 colon per [facility] 4 mm polyp sigmoid colon, pathology post inflammatory polyp, follow-up 5 years based upon results  7/18/24 colon per [facility] small internal hemorrhoids repeat 10 years     history covid  12/31/22 covid infection      History depression  Had been on prozac many yrs ago  12/2/10 start zoloft 50 mg  1/11 off zoloft  3/14/13 start zoloft    11/18/14 off zoloft  8/4/19 now agrees to SSRI trial via [employer], will start zoloft 25 mg for perimenopausal symptoms, sent to pharmacy advised to schedule appointment for follow-up  7/13/20 start zoloft 25 mg  5/6/21 off zoloft  3/29/23 start zoloft 25 mg again   5/2/24 start zoloft 25 mg x 1 week then increase to zoloft 50 mg  Tried prozac and zoloft     history shoulder  pain  5/16/13 x-ray of shoulder minimal DTT acromioclavicular joint  5/16/13 MRI left shoulder moderate acromioclavicular edema suggesting grade 1 sprain or overuse, intact rotator cuff and labrum  9/23/13 saw [facility] no report yet, surgery recommended, would like second opinion, referral to  pending  2/26/14  ortho note; left a.c. joint injection for bursitis, repeat a.c. joint or possible subacromial or subcoracoid injection in the future if symptoms persist  9/22/16 dr.albertson BERKOWITZ note anterior right shoulder pain unclear etiology, referred for MRI  10/5/16 MRI right shoulder tendinosis supraspinatus, mild DJD right before meals with significant marrow edema and soft tissue edema, likely external impingement supraspinatus  12/20/16  orthopedic note persistent right shoulder pain despite steroid injection, right shoulder rotator cuff syndrome and before meals injury, possible subcoracoid impingement, offered repeat injection versus arthroscopy, patient will consider     History UTI  3/14/13 UTI e.coli sensitive to all antibiotics  6/24/14 UTI e.coli    9/29/15 UTI symptoms self treated with cipro    8/25/17 UTI proteus sensitive to all antbiotics except nitrofurantoin, started on bactrim  9/1/17 use cipro prn UTI symptoms  5/11/18 UA negative  7/24/19 UA negative  6/29/20 UA negative  5/11/21 UA negative  3/6/23 poc urinalysis positive, send urine for culture, start bactrim empirically  5/7/24 urine culture negative      Low back pain  11/09 MRI LS L4-5 disc reveals minimal disc bulging   12/21/16 MRI lumbar spine slight interval progression of lumbar spondylosis compared to 2009 per dr.albertson BERKOWITZ   2/6/17 has tried anti-inflammatories, prednisone, narcotics, physical therapy, acupuncture no benefit     osteopenia  3/30/22 dexa at [facility] LS-1.6,hip-2.4; FRAX major 7.6%, hip 1.5%  4/12/22 vit d 21  3/20/23 vit d 32 labs done at [facility]  5/7/24 vit d 27    "  Perimenopausal  7/15/19 labs ordered, hot flashes, declines HRT or SSRI, monitor symptoms likely postmenopausal  7/24/19 estradiol 7.7,LH 40,FSH 93  8/4/19 now agrees to SSRI trial via mychart, will start zoloft 25 mg  5/11/20 did not start zoloft  7/13/20 start zoloft 25 mg  5/6/21 off zoloft 3/29/23 start zoloft 25 mg again   3/29/23 start zoloft 25 mg again   2/27/24  [facility] gyn note  5/1/24 on estradiol vaginal 0.01% [facility]     Preventative health  6/17/16 td in [place]  7/24/19 estradiol 7.7,LH 40,FSH 93  3/30/22 dexa at [facility] LS-1.6,hip-2.4; FRAX major 7.6%, hip 1.5%  11/22/22 pap per gyn stanislav walker  3/6/23 declines mammogram  3/6/23 declines colon  3/20/23 hep c ab negative labs done at [facility]  2/27/24  [facility] gyn note  5/2/24 declines all vaccines  5/7/24 A1c 5.4%  5/7/24 vit d 27 start 2000 units dailly   7/18/24 colon per [facility] small internal hemorrhoids repeat 10 years     Status post appendectomy          Patient Active Problem List   Diagnosis    S/P appendectomy    Preventative health care    Low back pain    Dyslipidemia    History of depression    History of shoulder pain    History of UTI    History of abdominal pain    Ovarian cyst    Perimenopausal    Osteopenia    History of COVID-19                    Patient Care Team:  Chago Fontenot M.D. as PCP - General (Internal Medicine)      ROS           Objective     /48 (BP Location: Left arm, Patient Position: Sitting, BP Cuff Size: Adult)   Pulse 63   Temp 37.2 °C (99 °F) (Temporal)   Ht 1.68 m (5' 6.14\")   Wt 54 kg (119 lb)   LMP 01/01/2020 (Approximate)   SpO2 97%   BMI 19.12 kg/m²      Physical Exam  Vitals and nursing note reviewed.   Constitutional:       Appearance: Normal appearance.   HENT:      Head: Normocephalic and atraumatic.      Right Ear: External ear normal.      Left Ear: External ear normal.      Nose: Nose normal.   Eyes:      Conjunctiva/sclera: Conjunctivae normal. "   Cardiovascular:      Rate and Rhythm: Normal rate and regular rhythm.      Heart sounds: Normal heart sounds.   Pulmonary:      Effort: Pulmonary effort is normal.      Breath sounds: Normal breath sounds.   Abdominal:      General: There is no distension.   Musculoskeletal:         General: No swelling.      Cervical back: Neck supple.   Skin:     Coloration: Skin is not jaundiced.      Findings: No bruising.   Neurological:      General: No focal deficit present.      Mental Status: She is alert.   Psychiatric:         Mood and Affect: Mood normal.         Behavior: Behavior normal.                  Assessment & Plan         Assessment  #1 annual exam     #2 postmenopausal had been off sertraline 25 mg but developed hot flashes, night sweats, mood changes after which she resumed the medication few months ago and symptoms have improved on the sertraline 25 mg    #3 dyslipidemia diet controlled 5/7/24 chol 245,trig 64,hdl 86,ldl 148, CT calcium score 2 years ago 0    #4 osteopenia postmenopausal bone loss    #5 history of low vitamin D not on vitamin D supplementation    #6 impaired glucose metabolism      Plan  #1 health maintenance reviewed and updated    #2 declines mammogram    #3 had colonoscopy last year negative repeat 10 years    #4 declines all vaccines    #5 take vitamin D 2000 units daily, calcium 1000 mg daily, continue with her good weight training program, try to get more cardiovascular training, continue with stretching    #6 need for 100 g daily of protein intake, she likely will need whey protein added to her regimen    #7 Labs ordered preventative health, given dyslipidemia will order one-time testing for lipoprotein a and apolipoprotein B as recommended by National lipid Association      #8 continue sertraline 25 mg refill sent to her mail-in pharmacy    #9 recommend bone density follow-up for osteopenia, order to [facility] for DEXA    #10 referral back to [facility]'s health follow-up cervical  cancer screening    #11 follow up one year

## 2025-05-12 LAB
25(OH)D3+25(OH)D2 SERPL-MCNC: 37.4 NG/ML (ref 30–100)
ALBUMIN SERPL-MCNC: 4.4 G/DL (ref 3.8–4.9)
ALP SERPL-CCNC: 83 IU/L (ref 44–121)
ALT SERPL-CCNC: 13 IU/L (ref 0–32)
APO B SERPL-MCNC: 121 MG/DL
APPEARANCE UR: CLEAR
AST SERPL-CCNC: 19 IU/L (ref 0–40)
BACTERIA #/AREA URNS HPF: NORMAL /[HPF]
BACTERIA UR CULT: NORMAL
BACTERIA UR CULT: NORMAL
BILIRUB SERPL-MCNC: 1.1 MG/DL (ref 0–1.2)
BILIRUB UR QL STRIP: NEGATIVE
BUN SERPL-MCNC: 15 MG/DL (ref 6–24)
BUN/CREAT SERPL: 17 (ref 9–23)
CALCIUM SERPL-MCNC: 9.1 MG/DL (ref 8.7–10.2)
CASTS URNS MICRO: NORMAL
CASTS URNS QL MICRO: NORMAL /LPF
CHLORIDE SERPL-SCNC: 100 MMOL/L (ref 96–106)
CHOLEST SERPL-MCNC: 277 MG/DL (ref 100–199)
CO2 SERPL-SCNC: 24 MMOL/L (ref 20–29)
COLOR UR: ABNORMAL
CREAT SERPL-MCNC: 0.86 MG/DL (ref 0.57–1)
CRYSTALS URNS MICRO: NORMAL
EGFRCR SERPLBLD CKD-EPI 2021: 79 ML/MIN/1.73
EPI CELLS #/AREA URNS HPF: NORMAL /HPF (ref 0–10)
GLOBULIN SER CALC-MCNC: 2.3 G/DL (ref 1.5–4.5)
GLUCOSE SERPL-MCNC: 76 MG/DL (ref 70–99)
GLUCOSE UR QL STRIP: NEGATIVE
HBA1C MFR BLD: 5.5 % (ref 4.8–5.6)
HDLC SERPL-MCNC: 89 MG/DL
HGB UR QL STRIP: NEGATIVE
KETONES UR QL STRIP: NEGATIVE
LDL CALC COMMENT:: ABNORMAL
LDLC SERPL CALC-MCNC: 177 MG/DL (ref 0–99)
LEUKOCYTE ESTERASE UR QL STRIP: ABNORMAL
LPA SERPL-SCNC: 105.1 NMOL/L
MICRO URNS: ABNORMAL
MICRO URNS: ABNORMAL
MUCOUS THREADS URNS QL MICRO: NORMAL
NITRITE UR QL STRIP: NEGATIVE
PH UR STRIP: 6.5 [PH] (ref 5–7.5)
POTASSIUM SERPL-SCNC: 4.2 MMOL/L (ref 3.5–5.2)
PROT SERPL-MCNC: 6.7 G/DL (ref 6–8.5)
PROT UR QL STRIP: NEGATIVE
RBC #/AREA URNS HPF: NORMAL /HPF (ref 0–2)
RENAL EPI CELLS #/AREA URNS HPF: NORMAL /[HPF]
SODIUM SERPL-SCNC: 136 MMOL/L (ref 134–144)
SP GR UR STRIP: 1.02 (ref 1–1.03)
T VAGINALIS URNS QL MICRO: NORMAL
TRIGL SERPL-MCNC: 72 MG/DL (ref 0–149)
TSH SERPL DL<=0.005 MIU/L-ACNC: 1.21 UIU/ML (ref 0.45–4.5)
UNIDENT CRYS URNS QL MICRO: NORMAL
URINALYSIS REFLEX  377202: ABNORMAL
URNS CMNT MICRO: NORMAL
UROBILINOGEN UR STRIP-MCNC: 0.2 MG/DL (ref 0.2–1)
VLDLC SERPL CALC-MCNC: 11 MG/DL (ref 5–40)
WBC #/AREA URNS HPF: NORMAL /HPF (ref 0–5)
YEAST #/AREA URNS HPF: NORMAL /[HPF]

## 2025-05-12 NOTE — Clinical Note
REFERRAL APPROVAL NOTICE         Sent on May 12, 2025                   Frank Fabian  [address] [place] [address]                   Dear Ms. Fabian,    After a careful review of the medical information and benefit coverage, [facility] has processed your referral. See below for additional details.    If applicable, you must be actively enrolled with your insurance for coverage of the authorized service. If you have any questions regarding your coverage, please contact your insurance directly.    REFERRAL INFORMATION   Referral #:  [id number]  Referred-To Provider    Referred-By Provider:  OB & Gyn - Gynecology    Chago STANLEY Fontenot M.D.   [facility]      [address]   [address]  [place] [address]  [phone] [address]  [address]  [place] [address]  [phone]    Referral Start Date:  05/05/2025  Referral End Date:   05/05/2026             SCHEDULING  If you do not already have an appointment, please call [phone] to make an appointment.     MORE INFORMATION  If you do not already have a [employer] account, sign up at: [email or web address]  You can access your medical information, make appointments, see lab results, billing information, and more.  If you have questions regarding this referral, please contact  the [facility] Referrals department at:             [phone]. Monday - Friday 8:00AM - 5:00PM.     Sincerely,    [facility]

## 2025-05-13 ENCOUNTER — RESULTS FOLLOW-UP (OUTPATIENT)
Dept: MEDICAL GROUP | Facility: MEDICAL CENTER | Age: 60
End: 2025-05-13
Payer: COMMERCIAL

## 2025-05-14 NOTE — TELEPHONE ENCOUNTER
Notified with labs total cholesterol and LDL elevated, lipoprotein a elevated however standard 10-year cardiac risk less than 2%, previous CT calcium score 0 two years ago, Barros 10-year cardiac risk incorporating CT calcium score also less than 2%, no family history of early cardiac disease, she would like to continue to work on her nutrition, continue good exercise program, I agree no statin therapy would be indicated.  We could consider repeating CT calcium score in 3 years, in the meantime we can repeat her standard labs in 1 year.